# Patient Record
Sex: MALE | Race: WHITE | NOT HISPANIC OR LATINO | Employment: UNEMPLOYED | URBAN - NONMETROPOLITAN AREA
[De-identification: names, ages, dates, MRNs, and addresses within clinical notes are randomized per-mention and may not be internally consistent; named-entity substitution may affect disease eponyms.]

---

## 2019-09-20 ENCOUNTER — HOSPITAL ENCOUNTER (INPATIENT)
Facility: HOSPITAL | Age: 44
LOS: 5 days | Discharge: NURSING, REHAB OR LONG-TERM CARE | End: 2019-09-25
Attending: PSYCHIATRY & NEUROLOGY | Admitting: PSYCHIATRY & NEUROLOGY

## 2019-09-20 ENCOUNTER — HOSPITAL ENCOUNTER (EMERGENCY)
Facility: HOSPITAL | Age: 44
Discharge: STILL ADMITTED OR ADMINISTRATIVE | End: 2019-09-20
Attending: FAMILY MEDICINE

## 2019-09-20 VITALS
SYSTOLIC BLOOD PRESSURE: 124 MMHG | HEIGHT: 73 IN | RESPIRATION RATE: 19 BRPM | TEMPERATURE: 99.5 F | WEIGHT: 205 LBS | DIASTOLIC BLOOD PRESSURE: 76 MMHG | OXYGEN SATURATION: 96 % | BODY MASS INDEX: 27.17 KG/M2 | HEART RATE: 82 BPM

## 2019-09-20 DIAGNOSIS — F29 PSYCHOSIS, UNSPECIFIED PSYCHOSIS TYPE (HCC): Primary | ICD-10-CM

## 2019-09-20 LAB
6-ACETYL MORPHINE: NEGATIVE
ALBUMIN SERPL-MCNC: 4.22 G/DL (ref 3.5–5.2)
ALBUMIN/GLOB SERPL: 1.2 G/DL
ALP SERPL-CCNC: 81 U/L (ref 39–117)
ALT SERPL W P-5'-P-CCNC: 32 U/L (ref 1–41)
AMPHET+METHAMPHET UR QL: NEGATIVE
ANION GAP SERPL CALCULATED.3IONS-SCNC: 7.9 MMOL/L (ref 5–15)
AST SERPL-CCNC: 21 U/L (ref 1–40)
BARBITURATES UR QL SCN: NEGATIVE
BASOPHILS # BLD AUTO: 0.06 10*3/MM3 (ref 0–0.2)
BASOPHILS NFR BLD AUTO: 0.8 % (ref 0–1.5)
BENZODIAZ UR QL SCN: NEGATIVE
BILIRUB SERPL-MCNC: 0.2 MG/DL (ref 0.2–1.2)
BILIRUB UR QL STRIP: NEGATIVE
BUN BLD-MCNC: 14 MG/DL (ref 6–20)
BUN/CREAT SERPL: 17.5 (ref 7–25)
BUPRENORPHINE SERPL-MCNC: NEGATIVE NG/ML
CALCIUM SPEC-SCNC: 9.4 MG/DL (ref 8.6–10.5)
CANNABINOIDS SERPL QL: NEGATIVE
CHLORIDE SERPL-SCNC: 99 MMOL/L (ref 98–107)
CLARITY UR: CLEAR
CO2 SERPL-SCNC: 28.1 MMOL/L (ref 22–29)
COCAINE UR QL: NEGATIVE
COLOR UR: YELLOW
CREAT BLD-MCNC: 0.8 MG/DL (ref 0.76–1.27)
DEPRECATED RDW RBC AUTO: 44.4 FL (ref 37–54)
EOSINOPHIL # BLD AUTO: 0.24 10*3/MM3 (ref 0–0.4)
EOSINOPHIL NFR BLD AUTO: 3.2 % (ref 0.3–6.2)
ERYTHROCYTE [DISTWIDTH] IN BLOOD BY AUTOMATED COUNT: 13.6 % (ref 12.3–15.4)
ETHANOL BLD-MCNC: <10 MG/DL (ref 0–10)
ETHANOL UR QL: <0.01 %
GFR SERPL CREATININE-BSD FRML MDRD: 106 ML/MIN/1.73
GLOBULIN UR ELPH-MCNC: 3.4 GM/DL
GLUCOSE BLD-MCNC: 98 MG/DL (ref 65–99)
GLUCOSE UR STRIP-MCNC: NEGATIVE MG/DL
HCT VFR BLD AUTO: 44.5 % (ref 37.5–51)
HGB BLD-MCNC: 14.1 G/DL (ref 13–17.7)
HGB UR QL STRIP.AUTO: NEGATIVE
IMM GRANULOCYTES # BLD AUTO: 0.05 10*3/MM3 (ref 0–0.05)
IMM GRANULOCYTES NFR BLD AUTO: 0.7 % (ref 0–0.5)
KETONES UR QL STRIP: NEGATIVE
LEUKOCYTE ESTERASE UR QL STRIP.AUTO: NEGATIVE
LYMPHOCYTES # BLD AUTO: 2.4 10*3/MM3 (ref 0.7–3.1)
LYMPHOCYTES NFR BLD AUTO: 31.9 % (ref 19.6–45.3)
MAGNESIUM SERPL-MCNC: 2 MG/DL (ref 1.6–2.6)
MCH RBC QN AUTO: 28.8 PG (ref 26.6–33)
MCHC RBC AUTO-ENTMCNC: 31.7 G/DL (ref 31.5–35.7)
MCV RBC AUTO: 90.8 FL (ref 79–97)
METHADONE UR QL SCN: NEGATIVE
MONOCYTES # BLD AUTO: 0.97 10*3/MM3 (ref 0.1–0.9)
MONOCYTES NFR BLD AUTO: 12.9 % (ref 5–12)
NEUTROPHILS # BLD AUTO: 3.8 10*3/MM3 (ref 1.7–7)
NEUTROPHILS NFR BLD AUTO: 50.5 % (ref 42.7–76)
NITRITE UR QL STRIP: NEGATIVE
OPIATES UR QL: NEGATIVE
OXYCODONE UR QL SCN: NEGATIVE
PCP UR QL SCN: NEGATIVE
PH UR STRIP.AUTO: <=5 [PH] (ref 5–8)
PLATELET # BLD AUTO: 291 10*3/MM3 (ref 140–450)
PMV BLD AUTO: 10 FL (ref 6–12)
POTASSIUM BLD-SCNC: 4.7 MMOL/L (ref 3.5–5.2)
PROT SERPL-MCNC: 7.6 G/DL (ref 6–8.5)
PROT UR QL STRIP: NEGATIVE
RBC # BLD AUTO: 4.9 10*6/MM3 (ref 4.14–5.8)
SODIUM BLD-SCNC: 135 MMOL/L (ref 136–145)
SP GR UR STRIP: 1.02 (ref 1–1.03)
UROBILINOGEN UR QL STRIP: NORMAL
WBC NRBC COR # BLD: 7.52 10*3/MM3 (ref 3.4–10.8)

## 2019-09-20 PROCEDURE — 80307 DRUG TEST PRSMV CHEM ANLYZR: CPT | Performed by: FAMILY MEDICINE

## 2019-09-20 PROCEDURE — 81003 URINALYSIS AUTO W/O SCOPE: CPT | Performed by: FAMILY MEDICINE

## 2019-09-20 PROCEDURE — 99285 EMERGENCY DEPT VISIT HI MDM: CPT

## 2019-09-20 PROCEDURE — 85025 COMPLETE CBC W/AUTO DIFF WBC: CPT | Performed by: FAMILY MEDICINE

## 2019-09-20 PROCEDURE — 80053 COMPREHEN METABOLIC PANEL: CPT | Performed by: FAMILY MEDICINE

## 2019-09-20 PROCEDURE — 93010 ELECTROCARDIOGRAM REPORT: CPT | Performed by: INTERNAL MEDICINE

## 2019-09-20 PROCEDURE — 83735 ASSAY OF MAGNESIUM: CPT | Performed by: FAMILY MEDICINE

## 2019-09-20 PROCEDURE — 93005 ELECTROCARDIOGRAM TRACING: CPT | Performed by: PSYCHIATRY & NEUROLOGY

## 2019-09-20 RX ORDER — ACETAMINOPHEN 325 MG/1
650 TABLET ORAL EVERY 6 HOURS PRN
Status: DISCONTINUED | OUTPATIENT
Start: 2019-09-20 | End: 2019-09-25 | Stop reason: HOSPADM

## 2019-09-20 RX ORDER — ALUMINA, MAGNESIA, AND SIMETHICONE 2400; 2400; 240 MG/30ML; MG/30ML; MG/30ML
15 SUSPENSION ORAL EVERY 6 HOURS PRN
Status: DISCONTINUED | OUTPATIENT
Start: 2019-09-20 | End: 2019-09-25 | Stop reason: HOSPADM

## 2019-09-20 RX ORDER — HYDROXYZINE 50 MG/1
50 TABLET, FILM COATED ORAL EVERY 6 HOURS PRN
Status: DISCONTINUED | OUTPATIENT
Start: 2019-09-20 | End: 2019-09-25 | Stop reason: HOSPADM

## 2019-09-20 RX ORDER — IBUPROFEN 400 MG/1
400 TABLET ORAL EVERY 6 HOURS PRN
Status: DISCONTINUED | OUTPATIENT
Start: 2019-09-20 | End: 2019-09-25 | Stop reason: HOSPADM

## 2019-09-20 RX ORDER — LOPERAMIDE HYDROCHLORIDE 2 MG/1
2 CAPSULE ORAL
Status: DISCONTINUED | OUTPATIENT
Start: 2019-09-20 | End: 2019-09-25 | Stop reason: HOSPADM

## 2019-09-20 RX ORDER — ONDANSETRON 4 MG/1
4 TABLET, FILM COATED ORAL EVERY 6 HOURS PRN
Status: DISCONTINUED | OUTPATIENT
Start: 2019-09-20 | End: 2019-09-25 | Stop reason: HOSPADM

## 2019-09-20 RX ORDER — BUSPIRONE HYDROCHLORIDE 10 MG/1
10 TABLET ORAL 2 TIMES DAILY
Status: ON HOLD | COMMUNITY
End: 2019-09-25 | Stop reason: SDUPTHER

## 2019-09-20 RX ORDER — CITALOPRAM 20 MG/1
20 TABLET ORAL DAILY
Status: ON HOLD | COMMUNITY
End: 2019-09-25 | Stop reason: SDUPTHER

## 2019-09-20 RX ORDER — NICOTINE 21 MG/24HR
1 PATCH, TRANSDERMAL 24 HOURS TRANSDERMAL
Status: DISCONTINUED | OUTPATIENT
Start: 2019-09-20 | End: 2019-09-25 | Stop reason: HOSPADM

## 2019-09-20 RX ORDER — BUSPIRONE HYDROCHLORIDE 10 MG/1
10 TABLET ORAL 2 TIMES DAILY
Status: DISCONTINUED | OUTPATIENT
Start: 2019-09-20 | End: 2019-09-25 | Stop reason: HOSPADM

## 2019-09-20 RX ORDER — TRAZODONE HYDROCHLORIDE 50 MG/1
50 TABLET ORAL NIGHTLY PRN
Status: DISCONTINUED | OUTPATIENT
Start: 2019-09-20 | End: 2019-09-25 | Stop reason: HOSPADM

## 2019-09-20 RX ORDER — FAMOTIDINE 20 MG/1
20 TABLET, FILM COATED ORAL 2 TIMES DAILY PRN
Status: DISCONTINUED | OUTPATIENT
Start: 2019-09-20 | End: 2019-09-25 | Stop reason: HOSPADM

## 2019-09-20 RX ORDER — BENZTROPINE MESYLATE 1 MG/ML
1 INJECTION INTRAMUSCULAR; INTRAVENOUS ONCE AS NEEDED
Status: DISCONTINUED | OUTPATIENT
Start: 2019-09-20 | End: 2019-09-25 | Stop reason: HOSPADM

## 2019-09-20 RX ORDER — MULTIVITAMIN
1 TABLET ORAL DAILY
Status: DISCONTINUED | OUTPATIENT
Start: 2019-09-21 | End: 2019-09-25 | Stop reason: HOSPADM

## 2019-09-20 RX ORDER — BENZTROPINE MESYLATE 1 MG/1
2 TABLET ORAL ONCE AS NEEDED
Status: DISCONTINUED | OUTPATIENT
Start: 2019-09-20 | End: 2019-09-25 | Stop reason: HOSPADM

## 2019-09-20 RX ORDER — MULTIVITAMIN
1 TABLET ORAL DAILY
COMMUNITY
End: 2019-09-25 | Stop reason: HOSPADM

## 2019-09-20 RX ORDER — OLANZAPINE 10 MG/1
20 TABLET ORAL NIGHTLY
Status: CANCELLED | OUTPATIENT
Start: 2019-09-20

## 2019-09-20 RX ORDER — OLANZAPINE 5 MG/1
10 TABLET ORAL 2 TIMES DAILY PRN
Status: DISCONTINUED | OUTPATIENT
Start: 2019-09-20 | End: 2019-09-20

## 2019-09-20 RX ORDER — CITALOPRAM 20 MG/1
20 TABLET ORAL DAILY
Status: DISCONTINUED | OUTPATIENT
Start: 2019-09-21 | End: 2019-09-25 | Stop reason: HOSPADM

## 2019-09-20 RX ORDER — BENZONATATE 100 MG/1
100 CAPSULE ORAL 3 TIMES DAILY PRN
Status: DISCONTINUED | OUTPATIENT
Start: 2019-09-20 | End: 2019-09-25 | Stop reason: HOSPADM

## 2019-09-20 RX ORDER — OLANZAPINE 5 MG/1
20 TABLET ORAL NIGHTLY
Status: DISCONTINUED | OUTPATIENT
Start: 2019-09-20 | End: 2019-09-25 | Stop reason: HOSPADM

## 2019-09-20 RX ORDER — OLANZAPINE 20 MG/1
20 TABLET ORAL NIGHTLY
Status: ON HOLD | COMMUNITY
End: 2019-09-25 | Stop reason: SDUPTHER

## 2019-09-20 RX ORDER — ECHINACEA PURPUREA EXTRACT 125 MG
2 TABLET ORAL AS NEEDED
Status: DISCONTINUED | OUTPATIENT
Start: 2019-09-20 | End: 2019-09-25 | Stop reason: HOSPADM

## 2019-09-20 RX ADMIN — BUSPIRONE HYDROCHLORIDE 10 MG: 10 TABLET ORAL at 21:10

## 2019-09-20 RX ADMIN — OLANZAPINE 20 MG: 5 TABLET, FILM COATED ORAL at 21:10

## 2019-09-20 RX ADMIN — IBUPROFEN 400 MG: 400 TABLET, FILM COATED ORAL at 21:10

## 2019-09-21 PROCEDURE — 99223 1ST HOSP IP/OBS HIGH 75: CPT | Performed by: PSYCHIATRY & NEUROLOGY

## 2019-09-21 RX ORDER — ARIPIPRAZOLE 10 MG/1
5 TABLET ORAL DAILY
Status: DISCONTINUED | OUTPATIENT
Start: 2019-09-21 | End: 2019-09-25 | Stop reason: HOSPADM

## 2019-09-21 RX ORDER — LIDOCAINE HYDROCHLORIDE 20 MG/ML
5 SOLUTION OROPHARYNGEAL
Status: DISCONTINUED | OUTPATIENT
Start: 2019-09-21 | End: 2019-09-25 | Stop reason: HOSPADM

## 2019-09-21 RX ADMIN — BUSPIRONE HYDROCHLORIDE 10 MG: 10 TABLET ORAL at 08:45

## 2019-09-21 RX ADMIN — IBUPROFEN 400 MG: 400 TABLET, FILM COATED ORAL at 18:03

## 2019-09-21 RX ADMIN — Medication 1 TABLET: at 08:45

## 2019-09-21 RX ADMIN — OLANZAPINE 20 MG: 5 TABLET, FILM COATED ORAL at 20:15

## 2019-09-21 RX ADMIN — CITALOPRAM HYDROBROMIDE 20 MG: 20 TABLET ORAL at 08:45

## 2019-09-21 RX ADMIN — NICOTINE 1 PATCH: 21 PATCH TRANSDERMAL at 09:51

## 2019-09-21 RX ADMIN — BUSPIRONE HYDROCHLORIDE 10 MG: 10 TABLET ORAL at 20:15

## 2019-09-21 RX ADMIN — IBUPROFEN 400 MG: 400 TABLET, FILM COATED ORAL at 08:45

## 2019-09-21 RX ADMIN — ARIPIPRAZOLE 5 MG: 10 TABLET ORAL at 15:04

## 2019-09-22 PROCEDURE — 99232 SBSQ HOSP IP/OBS MODERATE 35: CPT | Performed by: PSYCHIATRY & NEUROLOGY

## 2019-09-22 RX ADMIN — OLANZAPINE 20 MG: 5 TABLET, FILM COATED ORAL at 20:30

## 2019-09-22 RX ADMIN — ACETAMINOPHEN 650 MG: 325 TABLET ORAL at 20:31

## 2019-09-22 RX ADMIN — NICOTINE 1 PATCH: 21 PATCH TRANSDERMAL at 09:42

## 2019-09-22 RX ADMIN — Medication 1 TABLET: at 09:37

## 2019-09-22 RX ADMIN — CITALOPRAM HYDROBROMIDE 20 MG: 20 TABLET ORAL at 09:37

## 2019-09-22 RX ADMIN — BUSPIRONE HYDROCHLORIDE 10 MG: 10 TABLET ORAL at 20:30

## 2019-09-22 RX ADMIN — ARIPIPRAZOLE 5 MG: 10 TABLET ORAL at 09:37

## 2019-09-22 RX ADMIN — BUSPIRONE HYDROCHLORIDE 10 MG: 10 TABLET ORAL at 09:38

## 2019-09-22 RX ADMIN — IBUPROFEN 400 MG: 400 TABLET, FILM COATED ORAL at 15:45

## 2019-09-23 PROBLEM — F22 PARANOID DELUSION: Status: ACTIVE | Noted: 2019-09-23

## 2019-09-23 PROBLEM — F60.2 ANTISOCIAL PERSONALITY DISORDER: Status: ACTIVE | Noted: 2019-09-23

## 2019-09-23 PROBLEM — F19.10 POLYSUBSTANCE ABUSE (HCC): Status: ACTIVE | Noted: 2019-09-23

## 2019-09-23 PROBLEM — F15.150: Status: ACTIVE | Noted: 2019-09-23

## 2019-09-23 PROBLEM — F15.10 METHAMPHETAMINE ABUSE: Status: ACTIVE | Noted: 2019-09-23

## 2019-09-23 PROBLEM — F15.94: Status: ACTIVE | Noted: 2019-09-23

## 2019-09-23 PROCEDURE — 99233 SBSQ HOSP IP/OBS HIGH 50: CPT | Performed by: PSYCHIATRY & NEUROLOGY

## 2019-09-23 RX ADMIN — BUSPIRONE HYDROCHLORIDE 10 MG: 10 TABLET ORAL at 20:17

## 2019-09-23 RX ADMIN — NICOTINE 1 PATCH: 21 PATCH TRANSDERMAL at 09:46

## 2019-09-23 RX ADMIN — ARIPIPRAZOLE 5 MG: 10 TABLET ORAL at 09:45

## 2019-09-23 RX ADMIN — CITALOPRAM HYDROBROMIDE 20 MG: 20 TABLET ORAL at 09:45

## 2019-09-23 RX ADMIN — IBUPROFEN 400 MG: 400 TABLET, FILM COATED ORAL at 20:16

## 2019-09-23 RX ADMIN — IBUPROFEN 400 MG: 400 TABLET, FILM COATED ORAL at 09:56

## 2019-09-23 RX ADMIN — OLANZAPINE 20 MG: 5 TABLET, FILM COATED ORAL at 20:17

## 2019-09-23 RX ADMIN — BUSPIRONE HYDROCHLORIDE 10 MG: 10 TABLET ORAL at 09:45

## 2019-09-23 RX ADMIN — Medication 1 TABLET: at 09:45

## 2019-09-24 PROCEDURE — 99232 SBSQ HOSP IP/OBS MODERATE 35: CPT | Performed by: PSYCHIATRY & NEUROLOGY

## 2019-09-24 RX ADMIN — BUSPIRONE HYDROCHLORIDE 10 MG: 10 TABLET ORAL at 20:14

## 2019-09-24 RX ADMIN — Medication 1 TABLET: at 09:16

## 2019-09-24 RX ADMIN — IBUPROFEN 400 MG: 400 TABLET, FILM COATED ORAL at 15:38

## 2019-09-24 RX ADMIN — NICOTINE 1 PATCH: 21 PATCH TRANSDERMAL at 09:15

## 2019-09-24 RX ADMIN — OLANZAPINE 20 MG: 5 TABLET, FILM COATED ORAL at 20:14

## 2019-09-24 RX ADMIN — BUSPIRONE HYDROCHLORIDE 10 MG: 10 TABLET ORAL at 09:17

## 2019-09-24 RX ADMIN — CITALOPRAM HYDROBROMIDE 20 MG: 20 TABLET ORAL at 09:16

## 2019-09-24 RX ADMIN — ARIPIPRAZOLE 5 MG: 10 TABLET ORAL at 09:17

## 2019-09-25 VITALS
TEMPERATURE: 97.9 F | BODY MASS INDEX: 27.36 KG/M2 | OXYGEN SATURATION: 97 % | HEIGHT: 72 IN | RESPIRATION RATE: 18 BRPM | WEIGHT: 202 LBS | SYSTOLIC BLOOD PRESSURE: 142 MMHG | DIASTOLIC BLOOD PRESSURE: 83 MMHG | HEART RATE: 98 BPM

## 2019-09-25 PROCEDURE — 99239 HOSP IP/OBS DSCHRG MGMT >30: CPT | Performed by: PSYCHIATRY & NEUROLOGY

## 2019-09-25 RX ORDER — CITALOPRAM 20 MG/1
20 TABLET ORAL DAILY
Qty: 30 TABLET | Refills: 0 | Status: SHIPPED | OUTPATIENT
Start: 2019-09-25 | End: 2022-08-16

## 2019-09-25 RX ORDER — BUSPIRONE HYDROCHLORIDE 10 MG/1
10 TABLET ORAL 2 TIMES DAILY
Qty: 60 TABLET | Refills: 0 | Status: SHIPPED | OUTPATIENT
Start: 2019-09-25 | End: 2022-08-16

## 2019-09-25 RX ORDER — OLANZAPINE 20 MG/1
20 TABLET ORAL NIGHTLY
Qty: 30 TABLET | Refills: 0 | Status: ON HOLD | OUTPATIENT
Start: 2019-09-25 | End: 2023-03-16

## 2019-09-25 RX ADMIN — CITALOPRAM HYDROBROMIDE 20 MG: 20 TABLET ORAL at 08:48

## 2019-09-25 RX ADMIN — BUSPIRONE HYDROCHLORIDE 10 MG: 10 TABLET ORAL at 08:51

## 2019-09-25 RX ADMIN — Medication 1 TABLET: at 08:48

## 2019-09-25 RX ADMIN — ARIPIPRAZOLE 5 MG: 10 TABLET ORAL at 08:51

## 2019-09-25 RX ADMIN — NICOTINE 1 PATCH: 21 PATCH TRANSDERMAL at 08:51

## 2020-10-19 ENCOUNTER — HOSPITAL ENCOUNTER (EMERGENCY)
Facility: HOSPITAL | Age: 45
Discharge: HOME | End: 2020-10-19
Attending: EMERGENCY MEDICINE | Admitting: EMERGENCY MEDICINE

## 2020-10-19 ENCOUNTER — APPOINTMENT (OUTPATIENT)
Dept: GENERAL RADIOLOGY | Facility: HOSPITAL | Age: 45
End: 2020-10-19

## 2020-10-19 VITALS
DIASTOLIC BLOOD PRESSURE: 97 MMHG | HEART RATE: 86 BPM | OXYGEN SATURATION: 99 % | SYSTOLIC BLOOD PRESSURE: 137 MMHG | RESPIRATION RATE: 18 BRPM | TEMPERATURE: 97.4 F

## 2020-10-19 DIAGNOSIS — L03.114 CELLULITIS OF HAND, LEFT: ICD-10-CM

## 2020-10-19 DIAGNOSIS — M54.41 ACUTE MIDLINE LOW BACK PAIN WITH BILATERAL SCIATICA: Primary | ICD-10-CM

## 2020-10-19 DIAGNOSIS — F15.10 METHAMPHETAMINE ABUSE, EPISODIC (HCC): ICD-10-CM

## 2020-10-19 DIAGNOSIS — M54.42 ACUTE MIDLINE LOW BACK PAIN WITH BILATERAL SCIATICA: Primary | ICD-10-CM

## 2020-10-19 PROCEDURE — 25010000002 DEXAMETHASONE SODIUM PHOSPHATE 20 MG/5ML SOLUTION: Performed by: PHYSICIAN ASSISTANT

## 2020-10-19 PROCEDURE — 72110 X-RAY EXAM L-2 SPINE 4/>VWS: CPT

## 2020-10-19 PROCEDURE — 99283 EMERGENCY DEPT VISIT LOW MDM: CPT

## 2020-10-19 PROCEDURE — 96372 THER/PROPH/DIAG INJ SC/IM: CPT

## 2020-10-19 RX ORDER — BACLOFEN 10 MG/1
10 TABLET ORAL ONCE
Status: COMPLETED | OUTPATIENT
Start: 2020-10-19 | End: 2020-10-19

## 2020-10-19 RX ORDER — IBUPROFEN 600 MG/1
600 TABLET ORAL EVERY 8 HOURS PRN
Qty: 21 TABLET | Refills: 0 | Status: SHIPPED | OUTPATIENT
Start: 2020-10-19 | End: 2022-08-16

## 2020-10-19 RX ORDER — BACLOFEN 10 MG/1
10 TABLET ORAL 2 TIMES DAILY
Qty: 14 TABLET | Refills: 0 | Status: SHIPPED | OUTPATIENT
Start: 2020-10-19 | End: 2022-08-16

## 2020-10-19 RX ORDER — DOXYCYCLINE 100 MG/1
100 CAPSULE ORAL ONCE
Status: COMPLETED | OUTPATIENT
Start: 2020-10-19 | End: 2020-10-19

## 2020-10-19 RX ORDER — DEXAMETHASONE SODIUM PHOSPHATE 4 MG/ML
10 INJECTION, SOLUTION INTRA-ARTICULAR; INTRALESIONAL; INTRAMUSCULAR; INTRAVENOUS; SOFT TISSUE ONCE
Status: COMPLETED | OUTPATIENT
Start: 2020-10-19 | End: 2020-10-19

## 2020-10-19 RX ORDER — DOXYCYCLINE 100 MG/1
100 CAPSULE ORAL 2 TIMES DAILY
Qty: 20 CAPSULE | Refills: 0 | Status: SHIPPED | OUTPATIENT
Start: 2020-10-19 | End: 2022-08-16

## 2020-10-19 RX ORDER — GABAPENTIN 100 MG/1
100 CAPSULE ORAL 2 TIMES DAILY
COMMUNITY
End: 2022-08-16

## 2020-10-19 RX ADMIN — DOXYCYCLINE 100 MG: 100 CAPSULE ORAL at 23:07

## 2020-10-19 RX ADMIN — DEXAMETHASONE SODIUM PHOSPHATE 10 MG: 4 INJECTION, SOLUTION INTRA-ARTICULAR; INTRALESIONAL; INTRAMUSCULAR; INTRAVENOUS; SOFT TISSUE at 21:43

## 2020-10-19 RX ADMIN — BACLOFEN 10 MG: 10 TABLET ORAL at 21:42

## 2022-08-16 ENCOUNTER — HOSPITAL ENCOUNTER (EMERGENCY)
Facility: HOSPITAL | Age: 47
Discharge: HOME | End: 2022-08-16
Attending: EMERGENCY MEDICINE | Admitting: EMERGENCY MEDICINE

## 2022-08-16 VITALS
RESPIRATION RATE: 16 BRPM | HEIGHT: 73 IN | OXYGEN SATURATION: 95 % | DIASTOLIC BLOOD PRESSURE: 77 MMHG | TEMPERATURE: 97.5 F | HEART RATE: 88 BPM | BODY MASS INDEX: 28.14 KG/M2 | WEIGHT: 212.3 LBS | SYSTOLIC BLOOD PRESSURE: 140 MMHG

## 2022-08-16 DIAGNOSIS — R10.9 FLANK PAIN: ICD-10-CM

## 2022-08-16 DIAGNOSIS — S61.216A LACERATION OF RIGHT LITTLE FINGER WITHOUT FOREIGN BODY WITHOUT DAMAGE TO NAIL, INITIAL ENCOUNTER: Primary | ICD-10-CM

## 2022-08-16 DIAGNOSIS — R31.9 HEMATURIA, UNSPECIFIED TYPE: ICD-10-CM

## 2022-08-16 LAB
ALBUMIN SERPL-MCNC: 4.4 G/DL (ref 3.5–5.2)
ALBUMIN/GLOB SERPL: 1.4 G/DL
ALP SERPL-CCNC: 85 U/L (ref 39–117)
ALT SERPL W P-5'-P-CCNC: 26 U/L (ref 1–41)
ANION GAP SERPL CALCULATED.3IONS-SCNC: 10.4 MMOL/L (ref 5–15)
AST SERPL-CCNC: 16 U/L (ref 1–40)
BACTERIA UR QL AUTO: ABNORMAL /HPF
BASOPHILS # BLD AUTO: 0.05 10*3/MM3 (ref 0–0.2)
BASOPHILS NFR BLD AUTO: 1.1 % (ref 0–1.5)
BILIRUB SERPL-MCNC: 0.2 MG/DL (ref 0–1.2)
BILIRUB UR QL STRIP: NEGATIVE
BUN SERPL-MCNC: 7 MG/DL (ref 6–20)
BUN/CREAT SERPL: 7.9 (ref 7–25)
CALCIUM SPEC-SCNC: 9.9 MG/DL (ref 8.6–10.5)
CHLORIDE SERPL-SCNC: 106 MMOL/L (ref 98–107)
CK MB SERPL-CCNC: 3.39 NG/ML
CLARITY UR: CLEAR
CO2 SERPL-SCNC: 26.6 MMOL/L (ref 22–29)
COLOR UR: YELLOW
CREAT SERPL-MCNC: 0.89 MG/DL (ref 0.76–1.27)
DEPRECATED RDW RBC AUTO: 42.5 FL (ref 37–54)
EGFRCR SERPLBLD CKD-EPI 2021: 107.7 ML/MIN/1.73
EOSINOPHIL # BLD AUTO: 0.08 10*3/MM3 (ref 0–0.4)
EOSINOPHIL NFR BLD AUTO: 1.7 % (ref 0.3–6.2)
ERYTHROCYTE [DISTWIDTH] IN BLOOD BY AUTOMATED COUNT: 13.2 % (ref 12.3–15.4)
GLOBULIN UR ELPH-MCNC: 3.2 GM/DL
GLUCOSE SERPL-MCNC: 94 MG/DL (ref 65–99)
GLUCOSE UR STRIP-MCNC: NEGATIVE MG/DL
HCT VFR BLD AUTO: 43.9 % (ref 37.5–51)
HGB BLD-MCNC: 14.4 G/DL (ref 13–17.7)
HGB UR QL STRIP.AUTO: ABNORMAL
HYALINE CASTS UR QL AUTO: ABNORMAL /LPF
IMM GRANULOCYTES # BLD AUTO: 0 10*3/MM3 (ref 0–0.05)
IMM GRANULOCYTES NFR BLD AUTO: 0 % (ref 0–0.5)
KETONES UR QL STRIP: NEGATIVE
LEUKOCYTE ESTERASE UR QL STRIP.AUTO: NEGATIVE
LYMPHOCYTES # BLD AUTO: 1.76 10*3/MM3 (ref 0.7–3.1)
LYMPHOCYTES NFR BLD AUTO: 37.1 % (ref 19.6–45.3)
MCH RBC QN AUTO: 28.8 PG (ref 26.6–33)
MCHC RBC AUTO-ENTMCNC: 32.8 G/DL (ref 31.5–35.7)
MCV RBC AUTO: 87.8 FL (ref 79–97)
MONOCYTES # BLD AUTO: 0.91 10*3/MM3 (ref 0.1–0.9)
MONOCYTES NFR BLD AUTO: 19.2 % (ref 5–12)
NEUTROPHILS NFR BLD AUTO: 1.94 10*3/MM3 (ref 1.7–7)
NEUTROPHILS NFR BLD AUTO: 40.9 % (ref 42.7–76)
NITRITE UR QL STRIP: NEGATIVE
NRBC BLD AUTO-RTO: 0 /100 WBC (ref 0–0.2)
PH UR STRIP.AUTO: 6.5 [PH] (ref 5–8)
PLATELET # BLD AUTO: 353 10*3/MM3 (ref 140–450)
PMV BLD AUTO: 10.2 FL (ref 6–12)
POTASSIUM SERPL-SCNC: 3.9 MMOL/L (ref 3.5–5.2)
PROT SERPL-MCNC: 7.6 G/DL (ref 6–8.5)
PROT UR QL STRIP: NEGATIVE
RBC # BLD AUTO: 5 10*6/MM3 (ref 4.14–5.8)
RBC # UR STRIP: ABNORMAL /HPF
REF LAB TEST METHOD: ABNORMAL
SODIUM SERPL-SCNC: 143 MMOL/L (ref 136–145)
SP GR UR STRIP: <=1.005 (ref 1–1.03)
SQUAMOUS #/AREA URNS HPF: ABNORMAL /HPF
UROBILINOGEN UR QL STRIP: ABNORMAL
WBC # UR STRIP: ABNORMAL /HPF
WBC NRBC COR # BLD: 4.74 10*3/MM3 (ref 3.4–10.8)

## 2022-08-16 PROCEDURE — 90471 IMMUNIZATION ADMIN: CPT | Performed by: NURSE PRACTITIONER

## 2022-08-16 PROCEDURE — 80053 COMPREHEN METABOLIC PANEL: CPT | Performed by: NURSE PRACTITIONER

## 2022-08-16 PROCEDURE — 87086 URINE CULTURE/COLONY COUNT: CPT | Performed by: NURSE PRACTITIONER

## 2022-08-16 PROCEDURE — 36415 COLL VENOUS BLD VENIPUNCTURE: CPT

## 2022-08-16 PROCEDURE — 85025 COMPLETE CBC W/AUTO DIFF WBC: CPT | Performed by: NURSE PRACTITIONER

## 2022-08-16 PROCEDURE — 25010000002 TETANUS-DIPHTH-ACELL PERTUSSIS 5-2.5-18.5 LF-MCG/0.5 SUSPENSION PREFILLED SYRINGE: Performed by: NURSE PRACTITIONER

## 2022-08-16 PROCEDURE — 82553 CREATINE MB FRACTION: CPT | Performed by: NURSE PRACTITIONER

## 2022-08-16 PROCEDURE — 90715 TDAP VACCINE 7 YRS/> IM: CPT | Performed by: NURSE PRACTITIONER

## 2022-08-16 PROCEDURE — 99283 EMERGENCY DEPT VISIT LOW MDM: CPT

## 2022-08-16 PROCEDURE — 81001 URINALYSIS AUTO W/SCOPE: CPT | Performed by: NURSE PRACTITIONER

## 2022-08-16 RX ORDER — CEPHALEXIN 250 MG/1
500 CAPSULE ORAL ONCE
Status: COMPLETED | OUTPATIENT
Start: 2022-08-16 | End: 2022-08-16

## 2022-08-16 RX ORDER — GINSENG 100 MG
1 CAPSULE ORAL 2 TIMES DAILY
Qty: 15 G | Refills: 0 | OUTPATIENT
Start: 2022-08-16 | End: 2023-01-23

## 2022-08-16 RX ORDER — DIAPER,BRIEF,INFANT-TODD,DISP
1 EACH MISCELLANEOUS ONCE
Status: COMPLETED | OUTPATIENT
Start: 2022-08-16 | End: 2022-08-16

## 2022-08-16 RX ORDER — CEPHALEXIN 500 MG/1
500 CAPSULE ORAL 2 TIMES DAILY
Qty: 14 CAPSULE | Refills: 0 | OUTPATIENT
Start: 2022-08-16 | End: 2023-01-23

## 2022-08-16 RX ADMIN — Medication 1 APPLICATION: at 13:35

## 2022-08-16 RX ADMIN — CEPHALEXIN 500 MG: 250 CAPSULE ORAL at 13:34

## 2022-08-16 RX ADMIN — TETANUS TOXOID, REDUCED DIPHTHERIA TOXOID AND ACELLULAR PERTUSSIS VACCINE, ADSORBED 0.5 ML: 5; 2.5; 8; 8; 2.5 SUSPENSION INTRAMUSCULAR at 13:34

## 2022-08-16 NOTE — ED PROVIDER NOTES
Time: 15:12 EDT  Arrived by: Private vehicle  Chief Complaint: Right finger laceration, flank pain  History provided by: Patient  History is limited by: N/A    History of Present Illness:  Patient is a 47 y.o. year old male that presents to the emergency department with complaints of a laceration to the right hand, fifth finger.  Patient reports that he had a knife in his hand and it slipped when he was trying to stab the wall and ended up cutting the ulnar side of his right fifth finger.  Patient reports this occurred 7 days ago.  Received no prior treatment for the injury.  The laceration is still open and healing by secondary intention.  Finger is somewhat red and swollen.  There is no discharge present.  Denies fever.  Complains of mild pain.  Unsure if his tetanus is up-to-date.    Patient additionally reports that at the beginning of this month in August he was admitted at the hospital for rhabdomyolysis after an episode of relapse and substance use.  Patient's drug of choice in our meth and heroin.  Last use was 3 days ago.  Today, he is additionally complaining of right flank pain as well as left flank pain.  Pain is nonradiating.  He states that he would like to have his kidney function checked to make sure that he is not having any further episodes of rhabdomyolysis.  He denies any difficulty with urination.  He reports that his urine is clear and yellow.  He reports that he is tolerating p.o. well.  He denies any nausea or vomiting.  Denies any abdominal pain.  He reports that he is supposed to go into substance treatment for rehab tomorrow.    HPI        Similar Symptoms Previously: Not previously seen for his laceration however he was admitted to the hospital for rhabdo in the beginning of this month.  Recently seen: No      Patient Care Team  Primary Care Provider: Unknown    Past Medical History:     No Known Allergies  Past Medical History:   Diagnosis Date   • Alcohol abuse    • Anxiety    • Bipolar  disorder (HCC)    • Depression    • Hepatitis C infection    • Suicide attempt (HCC)      Past Surgical History:   Procedure Laterality Date   • HAND SURGERY      right hand      History reviewed. No pertinent family history.    Home Medications:  Prior to Admission medications    Medication Sig Start Date End Date Taking? Authorizing Provider   OLANZapine (zyPREXA) 20 MG tablet Take 1 tablet by mouth Every Night. 9/25/19   Mynor Michelle MD   baclofen (LIORESAL) 10 MG tablet Take 1 tablet by mouth 2 (Two) Times a Day. 10/19/20 8/16/22  Pablo Chapman MD   busPIRone (BUSPAR) 10 MG tablet Take 1 tablet by mouth 2 (Two) Times a Day. 9/25/19 8/16/22  Mynor Michelle MD   citalopram (CeleXA) 20 MG tablet Take 1 tablet by mouth Daily. 9/25/19 8/16/22  Mynor Michelle MD   doxycycline (MONODOX) 100 MG capsule Take 1 capsule by mouth 2 (Two) Times a Day. 10/19/20 8/16/22  Pablo Chapman MD   gabapentin (NEURONTIN) 100 MG capsule Take 100 mg by mouth 2 (two) times a day.  8/16/22  Filemon Harris MD   ibuprofen (ADVIL,MOTRIN) 600 MG tablet Take 1 tablet by mouth Every 8 (Eight) Hours As Needed for Moderate Pain . 10/19/20 8/16/22  Pablo Chapman MD   sertraline (ZOLOFT) 50 MG tablet Take 50 mg by mouth Daily.  8/16/22  ProviderFilemon MD        Social History:   PT  reports that he has been smoking cigarettes. He has been smoking about 0.50 packs per day. He has never used smokeless tobacco. He reports current alcohol use of about 2.0 standard drinks of alcohol per week. He reports current drug use. Drugs: Amphetamines, Heroin, Cocaine(coke), and Marijuana.    Record Review:  I have reviewed the patient's records in [facility].     Review of Systems  Review of Systems   Constitutional: Negative for chills, fatigue and fever.   HENT: Negative for rhinorrhea and sore throat.    Eyes: Negative.    Respiratory: Negative for cough, chest tightness and shortness of breath.   "  Cardiovascular: Negative for chest pain and palpitations.   Gastrointestinal: Negative for abdominal pain, diarrhea, nausea and vomiting.   Endocrine: Negative.    Genitourinary: Positive for flank pain (Left and right flank pain). Negative for decreased urine volume, difficulty urinating, dysuria, frequency, hematuria, penile pain, penile swelling, testicular pain and urgency.   Musculoskeletal: Negative for arthralgias and myalgias.   Skin: Positive for wound (Laceration to right fifth finger). Negative for color change and rash.   Allergic/Immunologic: Negative.    Neurological: Negative for dizziness, syncope, weakness, light-headedness and headaches.   Hematological: Negative.    Psychiatric/Behavioral: Negative for agitation and confusion. The patient is not nervous/anxious.         Physical Exam  /77   Pulse 88   Temp 97.5 °F (36.4 °C) (Oral)   Resp 16   Ht 185.4 cm (73\")   Wt 96.3 kg (212 lb 4.9 oz)   SpO2 95%   BMI 28.01 kg/m²     Physical Exam  Vitals and nursing note reviewed.   Constitutional:       General: He is not in acute distress.     Appearance: Normal appearance. He is not ill-appearing.   HENT:      Head: Normocephalic and atraumatic.      Nose: Nose normal.   Eyes:      Extraocular Movements: Extraocular movements intact.      Conjunctiva/sclera: Conjunctivae normal.      Pupils: Pupils are equal, round, and reactive to light.   Cardiovascular:      Rate and Rhythm: Normal rate and regular rhythm.      Pulses: Normal pulses.      Heart sounds: Normal heart sounds. No murmur heard.    No gallop.   Pulmonary:      Effort: Pulmonary effort is normal. No respiratory distress.      Breath sounds: Normal breath sounds. No wheezing, rhonchi or rales.   Chest:      Chest wall: No tenderness.   Abdominal:      General: Bowel sounds are normal. There is no distension.      Palpations: Abdomen is soft.      Tenderness: There is no abdominal tenderness. There is right CVA tenderness and left " "CVA tenderness. There is no guarding.   Musculoskeletal:         General: Swelling (Right fifth digit), tenderness (Right fifth digit) and signs of injury present. No deformity. Normal range of motion.      Cervical back: Normal range of motion and neck supple.   Skin:     General: Skin is warm and dry.      Capillary Refill: Capillary refill takes less than 2 seconds.      Findings: No erythema or rash.      Comments: Healing laceration noted to right fifth digit lateral side.  There is no active drainage.  Scabbing is noted.  There is no bleeding.  Is open to air upon arrival.  There is mild erythema to the surrounding tissue.  There is some mild tenderness and swelling to the fifth digit.  Redness does not extend past the finger.   Neurological:      Mental Status: He is alert and oriented to person, place, and time.      Motor: No weakness.   Psychiatric:         Mood and Affect: Mood normal.         Behavior: Behavior normal.                  ED Course  /77   Pulse 88   Temp 97.5 °F (36.4 °C) (Oral)   Resp 16   Ht 185.4 cm (73\")   Wt 96.3 kg (212 lb 4.9 oz)   SpO2 95%   BMI 28.01 kg/m²   Results for orders placed or performed during the hospital encounter of 08/16/22   Comprehensive Metabolic Panel    Specimen: Blood   Result Value Ref Range    Glucose 94 65 - 99 mg/dL    BUN 7 6 - 20 mg/dL    Creatinine 0.89 0.76 - 1.27 mg/dL    Sodium 143 136 - 145 mmol/L    Potassium 3.9 3.5 - 5.2 mmol/L    Chloride 106 98 - 107 mmol/L    CO2 26.6 22.0 - 29.0 mmol/L    Calcium 9.9 8.6 - 10.5 mg/dL    Total Protein 7.6 6.0 - 8.5 g/dL    Albumin 4.40 3.50 - 5.20 g/dL    ALT (SGPT) 26 1 - 41 U/L    AST (SGOT) 16 1 - 40 U/L    Alkaline Phosphatase 85 39 - 117 U/L    Total Bilirubin 0.2 0.0 - 1.2 mg/dL    Globulin 3.2 gm/dL    A/G Ratio 1.4 g/dL    BUN/Creatinine Ratio 7.9 7.0 - 25.0    Anion Gap 10.4 5.0 - 15.0 mmol/L    eGFR 107.7 >60.0 mL/min/1.73   CK-MB    Specimen: Blood   Result Value Ref Range    CKMB 3.39 " <=10.40 ng/mL   CBC Auto Differential    Specimen: Blood   Result Value Ref Range    WBC 4.74 3.40 - 10.80 10*3/mm3    RBC 5.00 4.14 - 5.80 10*6/mm3    Hemoglobin 14.4 13.0 - 17.7 g/dL    Hematocrit 43.9 37.5 - 51.0 %    MCV 87.8 79.0 - 97.0 fL    MCH 28.8 26.6 - 33.0 pg    MCHC 32.8 31.5 - 35.7 g/dL    RDW 13.2 12.3 - 15.4 %    RDW-SD 42.5 37.0 - 54.0 fl    MPV 10.2 6.0 - 12.0 fL    Platelets 353 140 - 450 10*3/mm3    Neutrophil % 40.9 (L) 42.7 - 76.0 %    Lymphocyte % 37.1 19.6 - 45.3 %    Monocyte % 19.2 (H) 5.0 - 12.0 %    Eosinophil % 1.7 0.3 - 6.2 %    Basophil % 1.1 0.0 - 1.5 %    Immature Grans % 0.0 0.0 - 0.5 %    Neutrophils, Absolute 1.94 1.70 - 7.00 10*3/mm3    Lymphocytes, Absolute 1.76 0.70 - 3.10 10*3/mm3    Monocytes, Absolute 0.91 (H) 0.10 - 0.90 10*3/mm3    Eosinophils, Absolute 0.08 0.00 - 0.40 10*3/mm3    Basophils, Absolute 0.05 0.00 - 0.20 10*3/mm3    Immature Grans, Absolute 0.00 0.00 - 0.05 10*3/mm3    nRBC 0.0 0.0 - 0.2 /100 WBC   Urinalysis With Culture If Indicated - Urine, Clean Catch    Specimen: Urine, Clean Catch   Result Value Ref Range    Color, UA Yellow Yellow, Straw    Appearance, UA Clear Clear    pH, UA 6.5 5.0 - 8.0    Specific Gravity, UA <=1.005 1.005 - 1.030    Glucose, UA Negative Negative    Ketones, UA Negative Negative    Bilirubin, UA Negative Negative    Blood, UA Small (1+) (A) Negative    Protein, UA Negative Negative    Leuk Esterase, UA Negative Negative    Nitrite, UA Negative Negative    Urobilinogen, UA 0.2 E.U./dL 0.2 - 1.0 E.U./dL   Urinalysis, Microscopic Only - Urine, Clean Catch    Specimen: Urine, Clean Catch   Result Value Ref Range    RBC, UA None Seen None Seen /HPF    WBC, UA 0-2 (A) None Seen /HPF    Bacteria, UA Trace (A) None Seen /HPF    Squamous Epithelial Cells, UA 0-2 None Seen, 0-2 /HPF    Hyaline Casts, UA None Seen None Seen /LPF    Methodology Manual Light Microscopy      Medications   bacitracin ointment 1 application (1 application Topical  Given 8/16/22 1335)   Tetanus-Diphth-Acell Pertussis (BOOSTRIX) injection 0.5 mL (0.5 mL Intramuscular Given 8/16/22 1334)   cephalexin (KEFLEX) capsule 500 mg (500 mg Oral Given 8/16/22 1334)     No results found.    Procedures/EKGs:  Procedures      Medical Decision Making:                     MDM  Number of Diagnoses or Management Options  Flank pain  Hematuria, unspecified type  Laceration of right little finger without foreign body without damage to nail, initial encounter  Diagnosis management comments: The patient presented with a laceration in need of repair. See laceration repair note for details. The wound was irrigated with copious normal saline irrigation. No sutures were used to approximate the wound edges as the laceration occurred more than 6 days ago. Wound care was provided as well as home care instructions and education. Tetanus Boostrix given. The patient tolerated the procedure well. Acute bleeding has ceased and the wound was treated in the emergency department. Patient was counseled to keep the wound clean, dry, and out of the sun. Patient was counseled to change dressings daily. Patient was advised to return to the ED for worsening erythema, pain, swelling, fever, excessive drainage or signs of infection. They were counseled to follow up for suture removal as described in the discharge instructions. Patient verbalizes understanding and agrees to follow up as instructed.    I have spoken with the patient. I have explained the patient´s condition, diagnoses and treatment plan based on the information available to me at this time. I have answered the patient's questions and addressed any concerns. The patient has a good  understanding of the patient´s diagnosis, condition, and treatment plan as can be expected at this point. The vital signs have been stable. The patient´s condition is stable and appropriate for discharge from the emergency department.      The patient will pursue further  outpatient evaluation with the primary care physician or other designated or consulting physician as outlined in the discharge instructions. They are agreeable to this plan of care and follow-up instructions have been explained in detail. The patient has received these instructions in written format and have expressed an understanding of the discharge instructions. The patient is aware that any significant change in condition or worsening of symptoms should prompt an immediate return to this or the closest emergency department or call to 911.       Amount and/or Complexity of Data Reviewed  Clinical lab tests: ordered and reviewed  Tests in the radiology section of CPT®: ordered and reviewed  Tests in the medicine section of CPT®: ordered and reviewed  Review and summarize past medical records: yes  Independent visualization of images, tracings, or specimens: yes    Risk of Complications, Morbidity, and/or Mortality  Presenting problems: moderate  Diagnostic procedures: moderate  Management options: moderate    Patient Progress  Patient progress: stable       Final diagnoses:   Laceration of right little finger without foreign body without damage to nail, initial encounter   Flank pain   Hematuria, unspecified type        Disposition:  ED Disposition     ED Disposition   Discharge    Condition   Stable    Comment   --              Chelsi Sparks, APRN  08/16/22 3630

## 2022-08-16 NOTE — DISCHARGE INSTRUCTIONS
Continue to wash your laceration at least twice daily and when soiled.  Apply the topical antibiotic ointment and keep covered.  Additionally I would like for you to take oral antibiotics.  Take until gone.  Continue to monitor the site for any worsening redness, pain, or discharge.    Your kidney function was normal today did not indicate any signs of rhabdomyolysis.  Continue drinking extra fluids and stay well-hydrated.  Monitor your urine output.  There was a small amount of blood in your urine and we will send for urine culture.  If anything abnormal comes back someone will call you and treat if necessary.    Please return to the emergency department with any new or worsening concerns.  Fever greater than 101, severe redness, swelling, foul-smelling drainage to your finger.  Return if you develop any sudden or worsening flank pain, abdominal pain, uncontrollable nausea, vomiting or diarrhea.   ,DirectAddress_Unknown,DirectAddress_Unknown

## 2022-08-17 LAB — BACTERIA SPEC AEROBE CULT: NO GROWTH

## 2023-01-23 ENCOUNTER — HOSPITAL ENCOUNTER (EMERGENCY)
Facility: HOSPITAL | Age: 48
Discharge: HOME | End: 2023-01-23
Attending: STUDENT IN AN ORGANIZED HEALTH CARE EDUCATION/TRAINING PROGRAM | Admitting: STUDENT IN AN ORGANIZED HEALTH CARE EDUCATION/TRAINING PROGRAM
Payer: MEDICAID

## 2023-01-23 ENCOUNTER — APPOINTMENT (OUTPATIENT)
Dept: GENERAL RADIOLOGY | Facility: HOSPITAL | Age: 48
End: 2023-01-23
Payer: MEDICAID

## 2023-01-23 VITALS
HEART RATE: 99 BPM | HEIGHT: 73 IN | SYSTOLIC BLOOD PRESSURE: 145 MMHG | TEMPERATURE: 98.6 F | OXYGEN SATURATION: 98 % | DIASTOLIC BLOOD PRESSURE: 97 MMHG | RESPIRATION RATE: 18 BRPM | BODY MASS INDEX: 29.82 KG/M2 | WEIGHT: 225 LBS

## 2023-01-23 VITALS
WEIGHT: 225 LBS | TEMPERATURE: 97.1 F | RESPIRATION RATE: 19 BRPM | HEART RATE: 113 BPM | HEIGHT: 73 IN | BODY MASS INDEX: 29.82 KG/M2 | SYSTOLIC BLOOD PRESSURE: 157 MMHG | DIASTOLIC BLOOD PRESSURE: 105 MMHG | OXYGEN SATURATION: 98 %

## 2023-01-23 DIAGNOSIS — F22 PARANOID BEHAVIOR: Primary | ICD-10-CM

## 2023-01-23 DIAGNOSIS — F31.9 BIPOLAR AFFECTIVE DISORDER, REMISSION STATUS UNSPECIFIED: Primary | ICD-10-CM

## 2023-01-23 LAB
ALBUMIN SERPL-MCNC: 4.9 G/DL (ref 3.5–5.2)
ALBUMIN/GLOB SERPL: 1.3 G/DL
ALP SERPL-CCNC: 90 U/L (ref 39–117)
ALT SERPL W P-5'-P-CCNC: 11 U/L (ref 1–41)
AMPHET+METHAMPHET UR QL: POSITIVE
AMPHET+METHAMPHET UR QL: POSITIVE
AMPHETAMINES UR QL: POSITIVE
AMPHETAMINES UR QL: POSITIVE
ANION GAP SERPL CALCULATED.3IONS-SCNC: 13.2 MMOL/L (ref 5–15)
AST SERPL-CCNC: 19 U/L (ref 1–40)
BACTERIA UR QL AUTO: ABNORMAL /HPF
BARBITURATES UR QL SCN: NEGATIVE
BARBITURATES UR QL SCN: NEGATIVE
BASOPHILS # BLD AUTO: 0.04 10*3/MM3 (ref 0–0.2)
BASOPHILS NFR BLD AUTO: 0.3 % (ref 0–1.5)
BENZODIAZ UR QL SCN: NEGATIVE
BENZODIAZ UR QL SCN: NEGATIVE
BILIRUB SERPL-MCNC: 0.9 MG/DL (ref 0–1.2)
BILIRUB UR QL STRIP: NEGATIVE
BUN SERPL-MCNC: 14 MG/DL (ref 6–20)
BUN/CREAT SERPL: 13.2 (ref 7–25)
BUPRENORPHINE SERPL-MCNC: NEGATIVE NG/ML
BUPRENORPHINE SERPL-MCNC: NEGATIVE NG/ML
CALCIUM SPEC-SCNC: 10.1 MG/DL (ref 8.6–10.5)
CANNABINOIDS SERPL QL: POSITIVE
CANNABINOIDS SERPL QL: POSITIVE
CHLORIDE SERPL-SCNC: 99 MMOL/L (ref 98–107)
CLARITY UR: ABNORMAL
CO2 SERPL-SCNC: 24.8 MMOL/L (ref 22–29)
COCAINE UR QL: NEGATIVE
COCAINE UR QL: NEGATIVE
COLOR UR: YELLOW
CREAT SERPL-MCNC: 1.06 MG/DL (ref 0.76–1.27)
DEPRECATED RDW RBC AUTO: 41.5 FL (ref 37–54)
EGFRCR SERPLBLD CKD-EPI 2021: 88.2 ML/MIN/1.73
EOSINOPHIL # BLD AUTO: 0.01 10*3/MM3 (ref 0–0.4)
EOSINOPHIL NFR BLD AUTO: 0.1 % (ref 0.3–6.2)
ERYTHROCYTE [DISTWIDTH] IN BLOOD BY AUTOMATED COUNT: 13.2 % (ref 12.3–15.4)
ETHANOL BLD-MCNC: 11 MG/DL (ref 0–10)
ETHANOL BLD-MCNC: <10 MG/DL (ref 0–10)
ETHANOL UR QL: 0.01 %
ETHANOL UR QL: <0.01 %
FLUAV RNA RESP QL NAA+PROBE: NOT DETECTED
FLUBV RNA RESP QL NAA+PROBE: NOT DETECTED
GLOBULIN UR ELPH-MCNC: 3.9 GM/DL
GLUCOSE SERPL-MCNC: 134 MG/DL (ref 65–99)
GLUCOSE UR STRIP-MCNC: NEGATIVE MG/DL
HCT VFR BLD AUTO: 47.2 % (ref 37.5–51)
HGB BLD-MCNC: 16.2 G/DL (ref 13–17.7)
HGB UR QL STRIP.AUTO: ABNORMAL
HOLD SPECIMEN: NORMAL
HOLD SPECIMEN: NORMAL
HYALINE CASTS UR QL AUTO: ABNORMAL /LPF
IMM GRANULOCYTES # BLD AUTO: 0.08 10*3/MM3 (ref 0–0.05)
IMM GRANULOCYTES NFR BLD AUTO: 0.5 % (ref 0–0.5)
KETONES UR QL STRIP: ABNORMAL
LEUKOCYTE ESTERASE UR QL STRIP.AUTO: NEGATIVE
LYMPHOCYTES # BLD AUTO: 1.39 10*3/MM3 (ref 0.7–3.1)
LYMPHOCYTES NFR BLD AUTO: 9 % (ref 19.6–45.3)
MAGNESIUM SERPL-MCNC: 1.8 MG/DL (ref 1.6–2.6)
MCH RBC QN AUTO: 29.6 PG (ref 26.6–33)
MCHC RBC AUTO-ENTMCNC: 34.3 G/DL (ref 31.5–35.7)
MCV RBC AUTO: 86.3 FL (ref 79–97)
METHADONE UR QL SCN: NEGATIVE
METHADONE UR QL SCN: NEGATIVE
MONOCYTES # BLD AUTO: 1.53 10*3/MM3 (ref 0.1–0.9)
MONOCYTES NFR BLD AUTO: 9.9 % (ref 5–12)
NEUTROPHILS NFR BLD AUTO: 12.48 10*3/MM3 (ref 1.7–7)
NEUTROPHILS NFR BLD AUTO: 80.2 % (ref 42.7–76)
NITRITE UR QL STRIP: NEGATIVE
NRBC BLD AUTO-RTO: 0 /100 WBC (ref 0–0.2)
OPIATES UR QL: NEGATIVE
OPIATES UR QL: NEGATIVE
OXYCODONE UR QL SCN: NEGATIVE
OXYCODONE UR QL SCN: NEGATIVE
PCP UR QL SCN: NEGATIVE
PCP UR QL SCN: NEGATIVE
PH UR STRIP.AUTO: 5.5 [PH] (ref 5–8)
PLATELET # BLD AUTO: 276 10*3/MM3 (ref 140–450)
PMV BLD AUTO: 9.9 FL (ref 6–12)
POTASSIUM SERPL-SCNC: 3.5 MMOL/L (ref 3.5–5.2)
PROPOXYPH UR QL: NEGATIVE
PROPOXYPH UR QL: NEGATIVE
PROT SERPL-MCNC: 8.8 G/DL (ref 6–8.5)
PROT UR QL STRIP: ABNORMAL
RBC # BLD AUTO: 5.47 10*6/MM3 (ref 4.14–5.8)
RBC # UR STRIP: ABNORMAL /HPF
REF LAB TEST METHOD: ABNORMAL
SARS-COV-2 RNA RESP QL NAA+PROBE: NOT DETECTED
SODIUM SERPL-SCNC: 137 MMOL/L (ref 136–145)
SP GR UR STRIP: 1.02 (ref 1–1.03)
SQUAMOUS #/AREA URNS HPF: ABNORMAL /HPF
TRICYCLICS UR QL SCN: NEGATIVE
TRICYCLICS UR QL SCN: POSITIVE
UROBILINOGEN UR QL STRIP: ABNORMAL
WBC # UR STRIP: ABNORMAL /HPF
WBC NRBC COR # BLD: 15.53 10*3/MM3 (ref 3.4–10.8)
WHOLE BLOOD HOLD COAG: NORMAL
WHOLE BLOOD HOLD SPECIMEN: NORMAL

## 2023-01-23 PROCEDURE — 36415 COLL VENOUS BLD VENIPUNCTURE: CPT

## 2023-01-23 PROCEDURE — 85025 COMPLETE CBC W/AUTO DIFF WBC: CPT | Performed by: STUDENT IN AN ORGANIZED HEALTH CARE EDUCATION/TRAINING PROGRAM

## 2023-01-23 PROCEDURE — 82077 ASSAY SPEC XCP UR&BREATH IA: CPT | Performed by: STUDENT IN AN ORGANIZED HEALTH CARE EDUCATION/TRAINING PROGRAM

## 2023-01-23 PROCEDURE — 99284 EMERGENCY DEPT VISIT MOD MDM: CPT

## 2023-01-23 PROCEDURE — 83735 ASSAY OF MAGNESIUM: CPT | Performed by: STUDENT IN AN ORGANIZED HEALTH CARE EDUCATION/TRAINING PROGRAM

## 2023-01-23 PROCEDURE — 80306 DRUG TEST PRSMV INSTRMNT: CPT | Performed by: PHYSICIAN ASSISTANT

## 2023-01-23 PROCEDURE — 80306 DRUG TEST PRSMV INSTRMNT: CPT | Performed by: STUDENT IN AN ORGANIZED HEALTH CARE EDUCATION/TRAINING PROGRAM

## 2023-01-23 PROCEDURE — 87636 SARSCOV2 & INF A&B AMP PRB: CPT | Performed by: STUDENT IN AN ORGANIZED HEALTH CARE EDUCATION/TRAINING PROGRAM

## 2023-01-23 PROCEDURE — 82077 ASSAY SPEC XCP UR&BREATH IA: CPT | Performed by: PHYSICIAN ASSISTANT

## 2023-01-23 PROCEDURE — 80053 COMPREHEN METABOLIC PANEL: CPT | Performed by: STUDENT IN AN ORGANIZED HEALTH CARE EDUCATION/TRAINING PROGRAM

## 2023-01-23 PROCEDURE — C9803 HOPD COVID-19 SPEC COLLECT: HCPCS

## 2023-01-23 PROCEDURE — 81001 URINALYSIS AUTO W/SCOPE: CPT | Performed by: STUDENT IN AN ORGANIZED HEALTH CARE EDUCATION/TRAINING PROGRAM

## 2023-01-23 RX ORDER — PROPRANOLOL HYDROCHLORIDE 20 MG/1
20 TABLET ORAL ONCE
Status: DISCONTINUED | OUTPATIENT
Start: 2023-01-23 | End: 2023-01-23

## 2023-01-23 NOTE — NURSING NOTE
Spoke to Dr. Ford, discussed assessment and labs, new orders to discharge the patient home at this time, doesn't meet admission criteria, give outpatient resources for medication refills, encourage to come back if active SI/HI ideation occur. [id number]

## 2023-01-23 NOTE — NURSING NOTE
"Pt states upon discharge he was attempting to find his way out, and he begin seeing people staring at him from the cars outside, he seen people in the trees, and unmarked police cars, and he felt he better come back inside because the paranoia was getting bad, states \"quiet frankly I am going to be kicked out of sober living\".     Again, pt is stating he is out of propanolol and Seroquel, and is discussing this with Jennifer HENDERSON at this time.   "

## 2023-01-23 NOTE — NURSING NOTE
"Pt states he is out of his psych meds, Seroquel, propanolol, states he is on Adderall and Klonopin, but he is ok on those.  States he has had some paranoia, and was taken off the Zyprexa due to Tardive dyskinesia 2.5 years ago.  Pt states he most recently had his medications filled in [facility].      Pt denies any SI, states he has one prior suicide attempt in 2015 via overdose.  Pt adamantly denies any SI at this time, states he just needs his Seroquel and Propanolol refilled.     Pt denies any HI unless others attempt to hurt him first.     Pt denies any alcohol or substance abuse on a daily basis, states he has been in/out of recovery for the last 9 years.  States when \"the shit ends up in his face he takes it\".  Pt states he is prescribed Klonopin 0.5 mg TID but hasn't had them in 3 months.     Pt rates depression 9/10 and anxiety 10/10 at this time     Pt states he does not want admission, but would like to have outpatient referral for medication refills in this area.   " JULISA CONKLIN 71y Male  MRN#: [id number]       SUBJECTIVE  Patient is a 71y old Male who presents with a chief complaint of Cerebellar bleed, DKA (20 Sep 2018 00:40)  Currently admitted to medicine with the primary diagnosis of ICH (intracerebral hemorrhage).    OBJECTIVE  PAST MEDICAL & SURGICAL HISTORY  Urinary tract infection without hematuria, site unspecified  Chronic kidney disease, unspecified CKD stage  Bilateral hearing loss, unspecified hearing loss type  Benign prostatic hyperplasia with urinary frequency  Diabetes  High cholesterol  HTN (hypertension)  Heart transplant recipient  H/O heart transplant    ALLERGIES:  codeine (Unknown)    MEDICATIONS:  STANDING MEDICATIONS  allopurinol 100 milliGRAM(s) Oral two times a day  amLODIPine Tablet 10 milliGRAM(s) Oral daily  aspirin  chewable 81 milliGRAM(s) Oral daily  atorvastatin 10 milliGRAM(s) Oral at bedtime  docusate sodium 100 milliGRAM(s) Oral two times a day  doxazosin 8 milliGRAM(s) Oral at bedtime  insulin glargine Injectable (LANTUS) 20 Unit(s) SubCutaneous every morning  insulin lispro (HumaLOG) corrective regimen sliding scale   SubCutaneous three times a day before meals  labetalol 100 milliGRAM(s) Oral two times a day  magnesium oxide 400 milliGRAM(s) Oral three times a day with meals  mycophenolate mofetil 1500 milliGRAM(s) Oral two times a day  pantoprazole    Tablet 40 milliGRAM(s) Oral before breakfast  senna 2 Tablet(s) Oral at bedtime  tacrolimus 1 milliGRAM(s) Oral every 12 hours  trimethoprim  160 mG/sulfamethoxazole 800 mG 1 Tablet(s) Oral two times a day    PRN MEDICATIONS  acetaminophen   Tablet .. 650 milliGRAM(s) Oral every 6 hours PRN  aluminum hydroxide/magnesium hydroxide/simethicone Suspension 30 milliLiter(s) Oral every 4 hours PRN  dextrose 40% Gel 15 Gram(s) Oral once PRN  glucagon  Injectable 1 milliGRAM(s) IntraMuscular once PRN      VITAL SIGNS: Last 24 Hours  T(C): 35.8 (20 Sep 2018 07:50), Max: 36.4 (19 Sep 2018 23:41)  T(F): 96.4 (20 Sep 2018 07:50), Max: 97.5 (19 Sep 2018 23:41)  HR: 99 (20 Sep 2018 07:50) (99 - 104)  BP: 142/79 (20 Sep 2018 07:50) (122/66 - 170/85)  BP(mean): --  RR: 18 (20 Sep 2018 07:50) (18 - 18)  SpO2: --    LABS:                        11.1   4.50  )-----------( 117      ( 20 Sep 2018 05:37 )             35.0     09-20    142  |  104  |  25<H>  ----------------------------<  212<H>  4.9   |  21  |  1.4    Ca    9.1      20 Sep 2018 05:37  Mg     1.7     09-20    PHYSICAL EXAM:    GENERAL: NAD, well-developed, AAOx3  HEENT:  Atraumatic, Normocephalic. EOMI, PERRLA, conjunctiva and sclera clear, No JVD  PULMONARY: Clear to auscultation bilaterally; No wheeze  CARDIOVASCULAR: Regular rate and rhythm; No murmurs, rubs, or gallops  GASTROINTESTINAL: Soft, Nontender, Nondistended  MUSCULOSKELETAL:  2+ Peripheral Pulses, No clubbing, cyanosis, or edema  NEUROLOGY: non-focal; slight instability on ambulation, possibly subjective  SKIN: No rashes or lesions

## 2023-01-23 NOTE — NURSING NOTE
Presented pt to Dr. Ford he does not think that that pt meets criteria at this time new order to discharge and provide outpatient resources [id number]

## 2023-01-23 NOTE — NURSING NOTE
Pt was adamant on leaving, states he has a friend who will pick him up, he doesn't like [place] and is going back to [place].

## 2023-01-23 NOTE — NURSING NOTE
Pt refuses chest x-ray at this time. Pt educated on the potential risks for not completing x-ray, and benefits. Pt verbalizes understanding and still refuses at this time.

## 2023-01-23 NOTE — DISCHARGE INSTRUCTIONS
Follow-up with a mental health provider at the next available appointment.  Return to the ED at any time if symptoms change or worsen.

## 2023-01-23 NOTE — ED PROVIDER NOTES
Subjective   History of Present Illness  47-year-old male who presents to the ED today for mental health evaluation.  He states he has been having issues with paranoia.  He does have a history of substance abuse as well as bipolar disorder.  He was seen here earlier this morning for a similar complaint.  He did not feel comfortable staying in the ED and requested to be discharged.  He states he did not realize that this was a way to get admitted to the [facility].  He then walked over to the [facility] and spoke with staff over there who recommended that he come back to the ED to be reevaluated.  He states he was initially dropped off by the police.  He states he feels like people and cars are watching him from the parking lot.  He states he has seen an unmarked police car and also saw people in trees.  He states he recently used methamphetamine and thinks that he may get kicked out of his sober living facility.  He denies any alcohol use.  He states his appetite has been normal but his sleep has been poor.  He states that he has ran out of his Seroquel and propranolol.  He states he ran out of his Seroquel a few days ago and his propranolol within the last couple weeks.  He states he also has a history of latent TB and is supposed to be on rifampin however he stopped taking that a few weeks ago as well.  He states he does have a productive cough.    History provided by:  Patient  Mental Health Problem  Presenting symptoms: bizarre behavior, hallucinations and paranoid behavior    Presenting symptoms: no suicidal thoughts    Degree of incapacity (severity):  Moderate  Onset quality:  Gradual  Duration:  2 weeks  Timing:  Constant  Progression:  Waxing and waning  Chronicity:  New  Context: drug abuse and noncompliance    Context: not alcohol use    Relieved by:  Nothing  Worsened by:  Nothing  Associated symptoms: anxiety and insomnia    Associated symptoms: no appetite change    Risk factors: hx of mental  illness        Review of Systems   Constitutional: Negative.  Negative for appetite change.   HENT: Negative.    Eyes: Negative.    Respiratory: Negative.    Cardiovascular: Negative.    Gastrointestinal: Negative.    Genitourinary: Negative.    Musculoskeletal: Negative.    Skin: Negative.    Neurological: Negative.    Psychiatric/Behavioral: Positive for hallucinations, paranoia and sleep disturbance. Negative for suicidal ideas. The patient is nervous/anxious and has insomnia.    All other systems reviewed and are negative.      Past Medical History:   Diagnosis Date   • Alcohol abuse    • Anxiety    • Bipolar disorder (HCC)    • Depression    • Hepatitis C infection    • Latent tuberculosis    • Mood disorder (HCC)    • PTSD (post-traumatic stress disorder)    • Suicide attempt (HCC)        No Known Allergies    Past Surgical History:   Procedure Laterality Date   • HAND SURGERY      right hand        History reviewed. No pertinent family history.    Social History     Socioeconomic History   • Marital status: Single   Tobacco Use   • Smoking status: Every Day     Packs/day: 0.50     Types: Cigarettes   • Smokeless tobacco: Never   Vaping Use   • Vaping Use: Never used   Substance and Sexual Activity   • Alcohol use: Not Currently     Alcohol/week: 2.0 standard drinks     Types: 2 Cans of beer per week   • Drug use: Yes     Types: Amphetamines, Marijuana, Methamphetamines   • Sexual activity: Yes     Partners: Female           Objective   Physical Exam  Vitals and nursing note reviewed.   Constitutional:       General: He is not in acute distress.     Appearance: Normal appearance.   HENT:      Head: Normocephalic and atraumatic.      Right Ear: External ear normal.      Left Ear: External ear normal.      Nose: Nose normal.   Eyes:      Conjunctiva/sclera: Conjunctivae normal.      Pupils: Pupils are equal, round, and reactive to light.   Cardiovascular:      Rate and Rhythm: Regular rhythm. Tachycardia  present.      Pulses: Normal pulses.      Heart sounds: Normal heart sounds.   Pulmonary:      Effort: Pulmonary effort is normal.      Breath sounds: Normal breath sounds.   Abdominal:      General: Bowel sounds are normal.      Palpations: Abdomen is soft.   Musculoskeletal:         General: Normal range of motion.      Cervical back: Normal range of motion and neck supple.   Skin:     General: Skin is warm and dry.      Capillary Refill: Capillary refill takes less than 2 seconds.   Neurological:      General: No focal deficit present.      Mental Status: He is alert and oriented to person, place, and time.   Psychiatric:         Mood and Affect: Mood is anxious.         Speech: Speech is tangential.         Behavior: Behavior is agitated. Behavior is cooperative.         Thought Content: Thought content is paranoid. Thought content does not include homicidal or suicidal ideation.         Procedures           ED Course                                           Medical Decision Making  47-year-old male presents to the ED today for mental health evaluation.  He reports issues with paranoid behavior and hallucinations.  He does have a history of methamphetamine use.  He is also out of his Seroquel and propranolol.  The patient was offered a chest x-ray due to his history of latent TB and the fact that he stopped taking his medication several weeks ago.  He refused.  He was noted to be mildly hypertensive and tachycardic.  He had been out of his propranolol for a few weeks.  A dose of that was ordered for him however he refused to take that as well.  He denied any suicidal or homicidal ideations.  Psychiatry was consulted who advised he did not meet inpatient criteria and could be discharged to follow-up outpatient.    Paranoid behavior (HCC): acute illness or injury      Final diagnoses:   Paranoid behavior (HCC)       ED Disposition  ED Disposition     ED Disposition   Discharge    Condition   Stable    Comment    --             [facility] [place] [address] [place] [address]   [place] [address] [phone]  Schedule an appointment as soon as possible for a visit in 2 days           Medication List      Stop    bacitracin 500 UNIT/GM ointment     cephalexin 500 MG capsule  Commonly known as: Jennifer White, PA  01/23/23 7047

## 2023-01-23 NOTE — ED PROVIDER NOTES
"Subjective   History of Present Illness  Patient is a 47-year-old male with significant past medical history positive for alcohol abuse, anxiety, bipolar disorder, depression, suicidal attempt presenting to the ER for psychiatric evaluation. Patient is displaying flight of ideas, paranoia, and agitation at this time. Patient states \"I was just walking towards the hopsital and these jackass police officers stopped to check my well being, I was headed to the police office to get the number to 911 or the [facility] to get help. I have been off my pysch meds for a while.\" Patient was escorted by police to hospital.  Patient denies HI or SI at this time.  Patient denies substance abuse.    History provided by:  Patient   used: No        Review of Systems   Constitutional: Negative.  Negative for fever.   HENT: Negative.    Respiratory: Negative.    Cardiovascular: Negative.  Negative for chest pain.   Gastrointestinal: Negative.  Negative for abdominal pain.   Endocrine: Negative.    Genitourinary: Negative.  Negative for dysuria.   Skin: Negative.    Neurological: Negative.    Psychiatric/Behavioral: Positive for agitation. Negative for suicidal ideas. The patient is hyperactive.    All other systems reviewed and are negative.      Past Medical History:   Diagnosis Date   • Alcohol abuse    • Anxiety    • Bipolar disorder (HCC)    • Depression    • Hepatitis C infection    • Mood disorder (HCC)    • PTSD (post-traumatic stress disorder)    • Suicide attempt (HCC)        No Known Allergies    Past Surgical History:   Procedure Laterality Date   • HAND SURGERY      right hand        History reviewed. No pertinent family history.    Social History     Socioeconomic History   • Marital status: Single   Tobacco Use   • Smoking status: Every Day     Packs/day: 0.50     Types: Cigarettes   • Smokeless tobacco: Never   Vaping Use   • Vaping Use: Never used   Substance and Sexual Activity   • Alcohol use: Not " Currently     Alcohol/week: 2.0 standard drinks     Types: 2 Cans of beer per week   • Drug use: Yes     Types: Amphetamines, Marijuana, Methamphetamines   • Sexual activity: Yes     Partners: Female           Objective   Physical Exam  Vitals and nursing note reviewed.   Constitutional:       General: He is not in acute distress.     Appearance: He is well-developed. He is not diaphoretic.   HENT:      Head: Normocephalic and atraumatic.      Right Ear: External ear normal.      Left Ear: External ear normal.      Nose: Nose normal.   Eyes:      Conjunctiva/sclera: Conjunctivae normal.      Pupils: Pupils are equal, round, and reactive to light.   Neck:      Vascular: No JVD.      Trachea: No tracheal deviation.   Cardiovascular:      Rate and Rhythm: Normal rate and regular rhythm.      Heart sounds: Normal heart sounds. No murmur heard.  Pulmonary:      Effort: Pulmonary effort is normal. No respiratory distress.      Breath sounds: Normal breath sounds. No wheezing.   Abdominal:      General: Bowel sounds are normal.      Palpations: Abdomen is soft.      Tenderness: There is no abdominal tenderness.   Musculoskeletal:         General: No deformity. Normal range of motion.      Cervical back: Normal range of motion and neck supple.   Skin:     General: Skin is warm and dry.      Coloration: Skin is not pale.      Findings: No erythema or rash.   Neurological:      Mental Status: He is alert and oriented to person, place, and time.      Cranial Nerves: No cranial nerve deficit.   Psychiatric:         Mood and Affect: Mood is elated.         Behavior: Behavior is agitated and hyperactive.         Thought Content: Thought content is paranoid. Thought content does not include homicidal or suicidal ideation. Thought content does not include homicidal or suicidal plan.         Procedures           ED Course  ED Course as of 01/23/23 0921   Mon Jan 23, 2023   0708 THC Screen, Urine(!): Positive [SM]   0708  "Methamphetamine, Ur(!): Positive [SM]   0708 Amphetamine, Urine Qual(!): Positive [SM]   0708 Tricyclic Antidepressants Screen(!): Positive [SM]   0755 Endorsed to Jennifer Escobar PA-C at shift change.  [SM]      ED Course User Index  [SM] Moises Lyla B, OZZY                                           Medical Decision Making  History of Present Illness  Patient is a 47-year-old male with significant past medical history positive for alcohol abuse, anxiety, bipolar disorder, depression, suicidal attempt presenting to the ER for psychiatric evaluation. Patient is displaying flight of ideas, paranoia, and agitation at this time. Patient states \"I was just walking towards the hopsital and these [place] police officers stopped to check my well being, I was headed to the police office to get the number to 911 or the [facility] to get help. I have been off my pysch meds for a while.\" Patient was escorted by police to hospital.  Patient denies HI or SI at this time.  Patient denies substance abuse.  Patient reports being out of his Seroquel and propranolol.  He adamantly denies any suicidal or homicidal ideations.  He does not want admission to the [facility], he just wants outpatient referral for medication refill.  Psychiatry was consulted who advised the patient can be discharged with outpatient follow-up.          Bipolar affective disorder, remission status unspecified (HCC): acute illness or injury  Amount and/or Complexity of Data Reviewed  Labs: ordered. Decision-making details documented in ED Course.          Final diagnoses:   Bipolar affective disorder, remission status unspecified (HCC)       ED Disposition  ED Disposition     ED Disposition   Discharge    Condition   Stable    Comment   --             [facility] [place] [address] [place] Greeting Card [facility] [address] [phone]  Schedule an appointment as soon as possible for a visit in 2 days           Medication " List      No changes were made to your prescriptions during this visit.          Jennifer Escobar PA  01/23/23 0788

## 2023-01-23 NOTE — NURSING NOTE
Pt assessment complete.     Pt reports coming here today because he is out of his psych medications. Seroquel which he has not had in 2 days and propanolol which he has not had in 2 weeks, he states he is also on Adderall and Klonopin.     Pt denies sihi/avh   He said he is paranoid by just people in general.     He states he has been in and out of recovery for 9 years but denies any alcohol or drug use other than meth which he last used today 1/23/23.   Dep 9  anx 10   Pt is rambling, and irritable throughout assessment.

## 2023-03-16 ENCOUNTER — HOSPITAL ENCOUNTER (EMERGENCY)
Facility: HOSPITAL | Age: 48
Discharge: PSYCHIATRIC OR SUBSTANCE USE FACILITY | DRG: 885 | End: 2023-03-16
Attending: EMERGENCY MEDICINE | Admitting: EMERGENCY MEDICINE
Payer: MEDICAID

## 2023-03-16 ENCOUNTER — HOSPITAL ENCOUNTER (INPATIENT)
Facility: HOSPITAL | Age: 48
LOS: 1 days | Discharge: LEFT BEFORE DISCHARGE | DRG: 885 | End: 2023-03-17
Attending: PSYCHIATRY & NEUROLOGY | Admitting: PSYCHIATRY & NEUROLOGY
Payer: MEDICAID

## 2023-03-16 VITALS
OXYGEN SATURATION: 99 % | HEART RATE: 86 BPM | TEMPERATURE: 98.7 F | RESPIRATION RATE: 18 BRPM | DIASTOLIC BLOOD PRESSURE: 86 MMHG | SYSTOLIC BLOOD PRESSURE: 135 MMHG | BODY MASS INDEX: 29.82 KG/M2 | WEIGHT: 225 LBS | HEIGHT: 73 IN

## 2023-03-16 DIAGNOSIS — F22 PARANOIA: Primary | ICD-10-CM

## 2023-03-16 PROBLEM — R45.851 SUICIDAL IDEATION: Status: ACTIVE | Noted: 2023-03-16

## 2023-03-16 LAB
ALBUMIN SERPL-MCNC: 4.8 G/DL (ref 3.5–5.2)
ALBUMIN/GLOB SERPL: 1.4 G/DL
ALP SERPL-CCNC: 91 U/L (ref 39–117)
ALT SERPL W P-5'-P-CCNC: 20 U/L (ref 1–41)
AMPHET+METHAMPHET UR QL: NEGATIVE
AMPHETAMINES UR QL: NEGATIVE
ANION GAP SERPL CALCULATED.3IONS-SCNC: 10.7 MMOL/L (ref 5–15)
AST SERPL-CCNC: 16 U/L (ref 1–40)
BARBITURATES UR QL SCN: NEGATIVE
BASOPHILS # BLD AUTO: 0.08 10*3/MM3 (ref 0–0.2)
BASOPHILS NFR BLD AUTO: 0.8 % (ref 0–1.5)
BENZODIAZ UR QL SCN: NEGATIVE
BILIRUB SERPL-MCNC: 0.2 MG/DL (ref 0–1.2)
BILIRUB UR QL STRIP: NEGATIVE
BUN SERPL-MCNC: 13 MG/DL (ref 6–20)
BUN/CREAT SERPL: 14.3 (ref 7–25)
BUPRENORPHINE SERPL-MCNC: NEGATIVE NG/ML
CALCIUM SPEC-SCNC: 9.9 MG/DL (ref 8.6–10.5)
CANNABINOIDS SERPL QL: POSITIVE
CHLORIDE SERPL-SCNC: 105 MMOL/L (ref 98–107)
CLARITY UR: CLEAR
CO2 SERPL-SCNC: 25.3 MMOL/L (ref 22–29)
COCAINE UR QL: NEGATIVE
COLOR UR: YELLOW
CREAT SERPL-MCNC: 0.91 MG/DL (ref 0.76–1.27)
DEPRECATED RDW RBC AUTO: 40.6 FL (ref 37–54)
EGFRCR SERPLBLD CKD-EPI 2021: 105.9 ML/MIN/1.73
EOSINOPHIL # BLD AUTO: 0.12 10*3/MM3 (ref 0–0.4)
EOSINOPHIL NFR BLD AUTO: 1.2 % (ref 0.3–6.2)
ERYTHROCYTE [DISTWIDTH] IN BLOOD BY AUTOMATED COUNT: 12.9 % (ref 12.3–15.4)
ETHANOL BLD-MCNC: <10 MG/DL (ref 0–10)
ETHANOL UR QL: <0.01 %
FLUAV RNA RESP QL NAA+PROBE: NOT DETECTED
FLUBV RNA ISLT QL NAA+PROBE: NOT DETECTED
GLOBULIN UR ELPH-MCNC: 3.4 GM/DL
GLUCOSE SERPL-MCNC: 115 MG/DL (ref 65–99)
GLUCOSE UR STRIP-MCNC: NEGATIVE MG/DL
HCT VFR BLD AUTO: 45.3 % (ref 37.5–51)
HGB BLD-MCNC: 15.3 G/DL (ref 13–17.7)
HGB UR QL STRIP.AUTO: NEGATIVE
IMM GRANULOCYTES # BLD AUTO: 0.03 10*3/MM3 (ref 0–0.05)
IMM GRANULOCYTES NFR BLD AUTO: 0.3 % (ref 0–0.5)
KETONES UR QL STRIP: NEGATIVE
LEUKOCYTE ESTERASE UR QL STRIP.AUTO: NEGATIVE
LYMPHOCYTES # BLD AUTO: 2.29 10*3/MM3 (ref 0.7–3.1)
LYMPHOCYTES NFR BLD AUTO: 22.7 % (ref 19.6–45.3)
MAGNESIUM SERPL-MCNC: 2 MG/DL (ref 1.6–2.6)
MCH RBC QN AUTO: 29.1 PG (ref 26.6–33)
MCHC RBC AUTO-ENTMCNC: 33.8 G/DL (ref 31.5–35.7)
MCV RBC AUTO: 86.3 FL (ref 79–97)
METHADONE UR QL SCN: NEGATIVE
MONOCYTES # BLD AUTO: 0.95 10*3/MM3 (ref 0.1–0.9)
MONOCYTES NFR BLD AUTO: 9.4 % (ref 5–12)
NEUTROPHILS NFR BLD AUTO: 6.61 10*3/MM3 (ref 1.7–7)
NEUTROPHILS NFR BLD AUTO: 65.6 % (ref 42.7–76)
NITRITE UR QL STRIP: NEGATIVE
NRBC BLD AUTO-RTO: 0 /100 WBC (ref 0–0.2)
OPIATES UR QL: NEGATIVE
OXYCODONE UR QL SCN: NEGATIVE
PCP UR QL SCN: NEGATIVE
PH UR STRIP.AUTO: 6 [PH] (ref 5–8)
PLATELET # BLD AUTO: 318 10*3/MM3 (ref 140–450)
PMV BLD AUTO: 10.4 FL (ref 6–12)
POTASSIUM SERPL-SCNC: 3.9 MMOL/L (ref 3.5–5.2)
PROPOXYPH UR QL: NEGATIVE
PROT SERPL-MCNC: 8.2 G/DL (ref 6–8.5)
PROT UR QL STRIP: NEGATIVE
RBC # BLD AUTO: 5.25 10*6/MM3 (ref 4.14–5.8)
SARS-COV-2 RNA RESP QL NAA+PROBE: NOT DETECTED
SODIUM SERPL-SCNC: 141 MMOL/L (ref 136–145)
SP GR UR STRIP: 1.02 (ref 1–1.03)
TRICYCLICS UR QL SCN: NEGATIVE
UROBILINOGEN UR QL STRIP: NORMAL
WBC NRBC COR # BLD: 10.08 10*3/MM3 (ref 3.4–10.8)

## 2023-03-16 PROCEDURE — 80053 COMPREHEN METABOLIC PANEL: CPT | Performed by: EMERGENCY MEDICINE

## 2023-03-16 PROCEDURE — 81003 URINALYSIS AUTO W/O SCOPE: CPT | Performed by: EMERGENCY MEDICINE

## 2023-03-16 PROCEDURE — 93005 ELECTROCARDIOGRAM TRACING: CPT | Performed by: PSYCHIATRY & NEUROLOGY

## 2023-03-16 PROCEDURE — 87636 SARSCOV2 & INF A&B AMP PRB: CPT | Performed by: EMERGENCY MEDICINE

## 2023-03-16 PROCEDURE — 36415 COLL VENOUS BLD VENIPUNCTURE: CPT

## 2023-03-16 PROCEDURE — 99285 EMERGENCY DEPT VISIT HI MDM: CPT

## 2023-03-16 PROCEDURE — 83735 ASSAY OF MAGNESIUM: CPT | Performed by: EMERGENCY MEDICINE

## 2023-03-16 PROCEDURE — 85025 COMPLETE CBC W/AUTO DIFF WBC: CPT | Performed by: EMERGENCY MEDICINE

## 2023-03-16 PROCEDURE — 82077 ASSAY SPEC XCP UR&BREATH IA: CPT | Performed by: EMERGENCY MEDICINE

## 2023-03-16 PROCEDURE — 80306 DRUG TEST PRSMV INSTRMNT: CPT | Performed by: EMERGENCY MEDICINE

## 2023-03-16 RX ORDER — HYDROXYZINE PAMOATE 25 MG/1
25 CAPSULE ORAL 3 TIMES DAILY PRN
COMMUNITY

## 2023-03-16 RX ORDER — HYDROXYZINE HYDROCHLORIDE 25 MG/1
25 TABLET, FILM COATED ORAL 3 TIMES DAILY PRN
Status: DISCONTINUED | OUTPATIENT
Start: 2023-03-16 | End: 2023-03-16

## 2023-03-16 RX ORDER — NICOTINE 21 MG/24HR
1 PATCH, TRANSDERMAL 24 HOURS TRANSDERMAL
Status: DISCONTINUED | OUTPATIENT
Start: 2023-03-17 | End: 2023-03-17 | Stop reason: HOSPADM

## 2023-03-16 RX ORDER — PROPRANOLOL HCL 60 MG
60 CAPSULE, EXTENDED RELEASE 24HR ORAL DAILY
Status: DISCONTINUED | OUTPATIENT
Start: 2023-03-17 | End: 2023-03-17 | Stop reason: HOSPADM

## 2023-03-16 RX ORDER — RIFAMPIN 300 MG/1
300 CAPSULE ORAL 2 TIMES DAILY
COMMUNITY

## 2023-03-16 RX ORDER — QUETIAPINE FUMARATE 100 MG/1
200 TABLET, FILM COATED ORAL NIGHTLY
Status: DISCONTINUED | OUTPATIENT
Start: 2023-03-16 | End: 2023-03-17 | Stop reason: HOSPADM

## 2023-03-16 RX ORDER — ALUMINA, MAGNESIA, AND SIMETHICONE 2400; 2400; 240 MG/30ML; MG/30ML; MG/30ML
15 SUSPENSION ORAL EVERY 6 HOURS PRN
Status: DISCONTINUED | OUTPATIENT
Start: 2023-03-16 | End: 2023-03-17 | Stop reason: HOSPADM

## 2023-03-16 RX ORDER — ECHINACEA PURPUREA EXTRACT 125 MG
2 TABLET ORAL AS NEEDED
Status: DISCONTINUED | OUTPATIENT
Start: 2023-03-16 | End: 2023-03-17 | Stop reason: HOSPADM

## 2023-03-16 RX ORDER — IBUPROFEN 400 MG/1
400 TABLET ORAL EVERY 6 HOURS PRN
Status: DISCONTINUED | OUTPATIENT
Start: 2023-03-16 | End: 2023-03-17 | Stop reason: HOSPADM

## 2023-03-16 RX ORDER — CLONIDINE HYDROCHLORIDE 0.1 MG/1
0.1 TABLET ORAL 3 TIMES DAILY PRN
Status: DISCONTINUED | OUTPATIENT
Start: 2023-03-16 | End: 2023-03-17 | Stop reason: HOSPADM

## 2023-03-16 RX ORDER — RIFAMPIN 300 MG/1
300 CAPSULE ORAL 2 TIMES DAILY
Status: DISCONTINUED | OUTPATIENT
Start: 2023-03-16 | End: 2023-03-17 | Stop reason: HOSPADM

## 2023-03-16 RX ORDER — LOSARTAN POTASSIUM 50 MG/1
50 TABLET ORAL DAILY
COMMUNITY

## 2023-03-16 RX ORDER — LOPERAMIDE HYDROCHLORIDE 2 MG/1
2 CAPSULE ORAL
Status: DISCONTINUED | OUTPATIENT
Start: 2023-03-16 | End: 2023-03-17 | Stop reason: HOSPADM

## 2023-03-16 RX ORDER — ACETAMINOPHEN 325 MG/1
650 TABLET ORAL EVERY 6 HOURS PRN
Status: DISCONTINUED | OUTPATIENT
Start: 2023-03-16 | End: 2023-03-17 | Stop reason: HOSPADM

## 2023-03-16 RX ORDER — BENZTROPINE MESYLATE 1 MG/1
2 TABLET ORAL ONCE AS NEEDED
Status: DISCONTINUED | OUTPATIENT
Start: 2023-03-16 | End: 2023-03-17 | Stop reason: HOSPADM

## 2023-03-16 RX ORDER — TRAZODONE HYDROCHLORIDE 50 MG/1
50 TABLET ORAL NIGHTLY PRN
Status: DISCONTINUED | OUTPATIENT
Start: 2023-03-16 | End: 2023-03-17 | Stop reason: HOSPADM

## 2023-03-16 RX ORDER — BENZONATATE 100 MG/1
100 CAPSULE ORAL 3 TIMES DAILY PRN
Status: DISCONTINUED | OUTPATIENT
Start: 2023-03-16 | End: 2023-03-17 | Stop reason: HOSPADM

## 2023-03-16 RX ORDER — CLONIDINE HYDROCHLORIDE 0.1 MG/1
0.1 TABLET ORAL 3 TIMES DAILY PRN
COMMUNITY

## 2023-03-16 RX ORDER — DIPHENOXYLATE HYDROCHLORIDE AND ATROPINE SULFATE 2.5; .025 MG/1; MG/1
1 TABLET ORAL DAILY
COMMUNITY

## 2023-03-16 RX ORDER — FAMOTIDINE 20 MG/1
20 TABLET, FILM COATED ORAL 2 TIMES DAILY PRN
Status: DISCONTINUED | OUTPATIENT
Start: 2023-03-16 | End: 2023-03-17 | Stop reason: HOSPADM

## 2023-03-16 RX ORDER — BENZTROPINE MESYLATE 1 MG/ML
1 INJECTION INTRAMUSCULAR; INTRAVENOUS ONCE AS NEEDED
Status: DISCONTINUED | OUTPATIENT
Start: 2023-03-16 | End: 2023-03-17 | Stop reason: HOSPADM

## 2023-03-16 RX ORDER — ONDANSETRON 4 MG/1
4 TABLET, FILM COATED ORAL EVERY 6 HOURS PRN
Status: DISCONTINUED | OUTPATIENT
Start: 2023-03-16 | End: 2023-03-17 | Stop reason: HOSPADM

## 2023-03-16 RX ORDER — LOSARTAN POTASSIUM 50 MG/1
50 TABLET ORAL DAILY
Status: DISCONTINUED | OUTPATIENT
Start: 2023-03-17 | End: 2023-03-17 | Stop reason: HOSPADM

## 2023-03-16 RX ORDER — DIPHENOXYLATE HYDROCHLORIDE AND ATROPINE SULFATE 2.5; .025 MG/1; MG/1
1 TABLET ORAL DAILY
Status: DISCONTINUED | OUTPATIENT
Start: 2023-03-17 | End: 2023-03-17 | Stop reason: HOSPADM

## 2023-03-16 RX ORDER — QUETIAPINE FUMARATE 200 MG/1
200 TABLET, FILM COATED ORAL NIGHTLY
COMMUNITY

## 2023-03-16 RX ORDER — HYDROXYZINE 50 MG/1
50 TABLET, FILM COATED ORAL EVERY 6 HOURS PRN
Status: DISCONTINUED | OUTPATIENT
Start: 2023-03-16 | End: 2023-03-17 | Stop reason: HOSPADM

## 2023-03-16 RX ORDER — PROPRANOLOL HCL 60 MG
60 CAPSULE, EXTENDED RELEASE 24HR ORAL DAILY
COMMUNITY

## 2023-03-16 RX ADMIN — TRAZODONE HYDROCHLORIDE 50 MG: 50 TABLET ORAL at 21:50

## 2023-03-16 RX ADMIN — HYDROXYZINE HYDROCHLORIDE 50 MG: 50 TABLET, FILM COATED ORAL at 21:50

## 2023-03-16 RX ADMIN — QUETIAPINE FUMARATE 200 MG: 100 TABLET ORAL at 22:54

## 2023-03-16 NOTE — ED PROVIDER NOTES
"Subjective   History of Present Illness  Patient is 47-year-old male who presents from [facility] for psychiatric evaluation, states \"I am having a paranoid episode\".  He states that this is been waxing and waning for about a week, worse today.  He denies suicidal homicidal ideations, auditory or visual hallucinations, other symptoms or other complaints.  He states that he is at [facility] to recover from methamphetamine and marijuana abuse.        Review of Systems   All other systems reviewed and are negative.      Past Medical History:   Diagnosis Date   • Alcohol abuse    • Anxiety    • Bipolar disorder (HCC)    • Depression    • Hepatitis C infection    • Latent tuberculosis    • Mood disorder (HCC)    • PTSD (post-traumatic stress disorder)    • Suicide attempt (HCC)        No Known Allergies    Past Surgical History:   Procedure Laterality Date   • HAND SURGERY      right hand        History reviewed. No pertinent family history.    Social History     Socioeconomic History   • Marital status: Single   Tobacco Use   • Smoking status: Every Day     Packs/day: 0.50     Types: Cigarettes   • Smokeless tobacco: Never   Vaping Use   • Vaping Use: Never used   Substance and Sexual Activity   • Alcohol use: Not Currently     Alcohol/week: 2.0 standard drinks     Types: 2 Cans of beer per week   • Drug use: Yes     Types: Amphetamines, Marijuana, Methamphetamines   • Sexual activity: Yes     Partners: Female           Objective   Physical Exam  Vitals and nursing note reviewed.   Constitutional:       General: He is not in acute distress.     Appearance: Normal appearance. He is well-developed. He is not ill-appearing, toxic-appearing or diaphoretic.   HENT:      Head: Normocephalic and atraumatic.   Eyes:      General: No scleral icterus.     Pupils: Pupils are equal, round, and reactive to light.   Neck:      Trachea: No tracheal deviation.   Cardiovascular:      Rate and Rhythm: Normal rate and regular " rhythm.   Pulmonary:      Effort: Pulmonary effort is normal. No respiratory distress.      Breath sounds: Normal breath sounds.   Chest:      Chest wall: No tenderness.   Abdominal:      General: Bowel sounds are normal.      Palpations: Abdomen is soft.      Tenderness: There is no abdominal tenderness. There is no guarding or rebound.   Musculoskeletal:         General: No tenderness. Normal range of motion.      Cervical back: Normal range of motion and neck supple. No rigidity or tenderness.      Right lower leg: No edema.      Left lower leg: No edema.   Skin:     General: Skin is warm and dry.      Capillary Refill: Capillary refill takes less than 2 seconds.      Coloration: Skin is not pale.   Neurological:      General: No focal deficit present.      Mental Status: He is alert and oriented to person, place, and time.      GCS: GCS eye subscore is 4. GCS verbal subscore is 5. GCS motor subscore is 6.      Motor: No abnormal muscle tone.   Psychiatric:         Mood and Affect: Mood normal.         Behavior: Behavior normal.         Procedures           ED Course  ED Course as of 03/19/23 1010   Thu Mar 16, 2023   2173 Case discussed with and care endorsed to Dr. Abad at shift change.  Electronically signed by Mynor Yan MD, 03/16/23, 6:53 PM EDT.   [CM]      ED Course User Index  [CM] Mynor Yan MD                                           [facility]    Final diagnoses:   Paranoia (HCC)       ED Disposition  ED Disposition     ED Disposition   DC/Transfer to Behavioral Health    Condition   --    Comment   --             No follow-up provider specified.       Medication List      No changes were made to your prescriptions during this visit.       Please note that portions of this note were completed with a voice recognition program.        Mynor Yan MD  03/19/23 1010

## 2023-03-16 NOTE — NURSING NOTE
"Pt states having a paranoid episode that he's very hostile and hes struggling with medications not helping, feeling hopeless.    Pt reports not getting the attention or what he needs at [facility] when he met with the practitioner.     Pt states went to [facility] for probation and is sanctioned to finish.    Pt was doing meth and marijuana states hasn't used       Pt admits to SI with plan to OD, admits to hx of suicide attempts.       pt states couldn't name person in particular and states he just felt they had some \"distaste\" towards him or \"whispering among themselves\" about him. Pt states he felt threatened and imminent danger and felt he may need to \"defend\" himself however had no intentions of killing anyone or hurting someone unless he needed to protect himself.    Pt was institutionalized for 15 years in intermediate and saw a lot of stuff and  Has been out since 2012    Pt reports \"no one in specific im having a paranoid episode\"    Pt is on rivampin for latent TB.    Pt denies AVH.    Depression-9/10  Anxiety-10/10    Pt reports poor sleep and appetite.  "

## 2023-03-16 NOTE — NURSING NOTE
Spoke to doctor Michelle intake information labs and V/S provided and discussed with provider, instructed to admit with routine SP3 orders [id number]. Patient and ER provider made aware of admitting orders and plan of care.

## 2023-03-17 VITALS
HEART RATE: 84 BPM | OXYGEN SATURATION: 96 % | BODY MASS INDEX: 28.86 KG/M2 | RESPIRATION RATE: 18 BRPM | DIASTOLIC BLOOD PRESSURE: 94 MMHG | SYSTOLIC BLOOD PRESSURE: 142 MMHG | TEMPERATURE: 98.1 F | WEIGHT: 217.8 LBS | HEIGHT: 73 IN

## 2023-03-17 PROBLEM — F60.89 CLUSTER B PERSONALITY DISORDER (HCC): Status: ACTIVE | Noted: 2019-09-23

## 2023-03-17 LAB
QT INTERVAL: 392 MS
QTC INTERVAL: 425 MS

## 2023-03-17 PROCEDURE — 99236 HOSP IP/OBS SAME DATE HI 85: CPT | Performed by: PSYCHIATRY & NEUROLOGY

## 2023-03-17 RX ADMIN — HYDROXYZINE HYDROCHLORIDE 50 MG: 50 TABLET, FILM COATED ORAL at 14:07

## 2023-03-17 RX ADMIN — CLONIDINE HYDROCHLORIDE 0.1 MG: 0.1 TABLET ORAL at 14:08
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
## 2023-03-17 NOTE — DISCHARGE SUMMARY
:  1975  MRN:  [id number]  Visit Number:  [id number]      Date of Admission:3/16/2023   Date of Discharge:  3/17/2023    Discharge Diagnosis:  Principal Problem:    Mood disorder (HCC)  Active Problems:    Methamphetamine abuse (HCC)    Paranoid delusion (HCC)    Cluster B personality disorder (HCC)    Suicidal ideation    Latent tuberculosis    Hypertension        Admission Diagnosis:  Suicidal ideation [R45.851]     HPI  Inocencio Joy is a 47 y.o. male who was admitted on 3/16/2023 with complaints of feeling paranoid and felt that people were against him at the rehab he has been for the last ten days.   For details please see H&P dated 3/17/23.    Hospital Course  Patient is a 47 y.o. male presented with anger and irritability with suicidal and homicidal thoughts though no one in particular. The patient was admitted to adult psych unit for safety, further evaluation and treatment. The patient.The patient exhibited pattern of affective instability and maladaptive coping skills indicative of underlying cluster B personality disorder with antisocial and borderline traits. The patient was very agitated initially but was able to calm down and talk about his past struggles and was tearful describing his past. The patient was receptive and agreed that he was very sensitive to rejection and despite his best efforts to make people happy he felt frustrated and angry it would lead him to feel suicidal or wanting to hurt others but no one in particular. The patient was able to process some of his feelings and calmed down. But later in the day he again became agitated and was not happy that he couldn't smoke and demanded to leave. He was encouraged to stay and let his medications be adjusted and work further on his coping skills. The patient was adamant on leaving and his plan was to go back to rehab. The patient denied thoughts of harm to self or others and didn't meet criteria for a hold and per his insistence he  "was discharged AMA.    Mental Status Exam upon discharge:   Mood \"irritable\"   Affect-congruent, appropriate, stable  Thought Content-goal directed, no delusional material present  Thought process-linear, organized.  Suicidality: No SI  Homicidality: No HI  Perception: No AH/VH    Procedures Performed         Consults:   Consults     No orders found for last 30 day(s).          Pertinent Test Results:   Admission on 03/16/2023   Component Date Value Ref Range Status   • QT Interval 03/17/2023 392  ms Preliminary   • QTC Interval 03/17/2023 425  ms Preliminary   Admission on 03/16/2023, Discharged on 03/16/2023   Component Date Value Ref Range Status   • Glucose 03/16/2023 115 (H)  65 - 99 mg/dL Final   • BUN 03/16/2023 13  6 - 20 mg/dL Final   • Creatinine 03/16/2023 0.91  0.76 - 1.27 mg/dL Final   • Sodium 03/16/2023 141  136 - 145 mmol/L Final   • Potassium 03/16/2023 3.9  3.5 - 5.2 mmol/L Final   • Chloride 03/16/2023 105  98 - 107 mmol/L Final   • CO2 03/16/2023 25.3  22.0 - 29.0 mmol/L Final   • Calcium 03/16/2023 9.9  8.6 - 10.5 mg/dL Final   • Total Protein 03/16/2023 8.2  6.0 - 8.5 g/dL Final   • Albumin 03/16/2023 4.8  3.5 - 5.2 g/dL Final   • ALT (SGPT) 03/16/2023 20  1 - 41 U/L Final   • AST (SGOT) 03/16/2023 16  1 - 40 U/L Final   • Alkaline Phosphatase 03/16/2023 91  39 - 117 U/L Final   • Total Bilirubin 03/16/2023 0.2  0.0 - 1.2 mg/dL Final   • Globulin 03/16/2023 3.4  gm/dL Final   • A/G Ratio 03/16/2023 1.4  g/dL Final   • BUN/Creatinine Ratio 03/16/2023 14.3  7.0 - 25.0 Final   • Anion Gap 03/16/2023 10.7  5.0 - 15.0 mmol/L Final   • eGFR 03/16/2023 105.9  >60.0 mL/min/1.73 Final   • Color, UA 03/16/2023 Yellow  Yellow, Straw Final   • Appearance, UA 03/16/2023 Clear  Clear Final   • pH, UA 03/16/2023 6.0  5.0 - 8.0 Final   • Specific Gravity, UA 03/16/2023 1.023  1.005 - 1.030 Final   • Glucose, UA 03/16/2023 Negative  Negative Final   • Ketones, UA 03/16/2023 Negative  Negative Final   • " Bilirubin, UA 03/16/2023 Negative  Negative Final   • Blood, UA 03/16/2023 Negative  Negative Final   • Protein, UA 03/16/2023 Negative  Negative Final   • Leuk Esterase, UA 03/16/2023 Negative  Negative Final   • Nitrite, UA 03/16/2023 Negative  Negative Final   • Urobilinogen, UA 03/16/2023 0.2 E.U./dL  0.2 - 1.0 E.U./dL Final   • THC, Screen, Urine 03/16/2023 Positive (A)  Negative Final   • Phencyclidine (PCP), Urine 03/16/2023 Negative  Negative Final   • Cocaine Screen, Urine 03/16/2023 Negative  Negative Final   • Methamphetamine, Ur 03/16/2023 Negative  Negative Final   • Opiate Screen 03/16/2023 Negative  Negative Final   • Amphetamine Screen, Urine 03/16/2023 Negative  Negative Final   • Benzodiazepine Screen, Urine 03/16/2023 Negative  Negative Final   • Tricyclic Antidepressants Screen 03/16/2023 Negative  Negative Final   • Methadone Screen, Urine 03/16/2023 Negative  Negative Final   • Barbiturates Screen, Urine 03/16/2023 Negative  Negative Final   • Oxycodone Screen, Urine 03/16/2023 Negative  Negative Final   • Propoxyphene Screen 03/16/2023 Negative  Negative Final   • Buprenorphine, Screen, Urine 03/16/2023 Negative  Negative Final   • Ethanol 03/16/2023 <10  0 - 10 mg/dL Final   • Ethanol % 03/16/2023 <0.010  % Final   • Magnesium 03/16/2023 2.0  1.6 - 2.6 mg/dL Final   • WBC 03/16/2023 10.08  3.40 - 10.80 10*3/mm3 Final   • RBC 03/16/2023 5.25  4.14 - 5.80 10*6/mm3 Final   • Hemoglobin 03/16/2023 15.3  13.0 - 17.7 g/dL Final   • Hematocrit 03/16/2023 45.3  37.5 - 51.0 % Final   • MCV 03/16/2023 86.3  79.0 - 97.0 fL Final   • MCH 03/16/2023 29.1  26.6 - 33.0 pg Final   • MCHC 03/16/2023 33.8  31.5 - 35.7 g/dL Final   • RDW 03/16/2023 12.9  12.3 - 15.4 % Final   • RDW-SD 03/16/2023 40.6  37.0 - 54.0 fl Final   • MPV 03/16/2023 10.4  6.0 - 12.0 fL Final   • Platelets 03/16/2023 318  140 - 450 10*3/mm3 Final   • Neutrophil % 03/16/2023 65.6  42.7 - 76.0 % Final   • Lymphocyte % 03/16/2023 22.7  19.6  - 45.3 % Final   • Monocyte % 03/16/2023 9.4  5.0 - 12.0 % Final   • Eosinophil % 03/16/2023 1.2  0.3 - 6.2 % Final   • Basophil % 03/16/2023 0.8  0.0 - 1.5 % Final   • Immature Grans % 03/16/2023 0.3  0.0 - 0.5 % Final   • Neutrophils, Absolute 03/16/2023 6.61  1.70 - 7.00 10*3/mm3 Final   • Lymphocytes, Absolute 03/16/2023 2.29  0.70 - 3.10 10*3/mm3 Final   • Monocytes, Absolute 03/16/2023 0.95 (H)  0.10 - 0.90 10*3/mm3 Final   • Eosinophils, Absolute 03/16/2023 0.12  0.00 - 0.40 10*3/mm3 Final   • Basophils, Absolute 03/16/2023 0.08  0.00 - 0.20 10*3/mm3 Final   • Immature Grans, Absolute 03/16/2023 0.03  0.00 - 0.05 10*3/mm3 Final   • nRBC 03/16/2023 0.0  0.0 - 0.2 /100 WBC Final   • COVID19 03/16/2023 Not Detected  Not Detected - Ref. Range Final   • Influenza A PCR 03/16/2023 Not Detected  Not Detected Final   • Influenza B PCR 03/16/2023 Not Detected  Not Detected Final        Condition on Discharge:  guarded    Vital Signs  Temp:  [97.8 °F (36.6 °C)-98.7 °F (37.1 °C)] 98.1 °F (36.7 °C)  Heart Rate:  [] 84  Resp:  [18-20] 18  BP: (121-172)/(79-97) 142/94      Discharge Disposition:  Left Against Medical Advice    Discharge Medications:     Discharge Medications      Continue These Medications      Instructions Start Date   cloNIDine 0.1 MG tablet  Commonly known as: CATAPRES   0.1 mg, Oral, 3 Times Daily PRN      Daily Vites tablet tablet   1 tablet, Oral, Daily      hydrOXYzine pamoate 25 MG capsule  Commonly known as: VISTARIL   25 mg, Oral, 3 Times Daily PRN      losartan 50 MG tablet  Commonly known as: COZAAR   50 mg, Oral, Daily      propranolol LA 60 MG 24 hr capsule  Commonly known as: INDERAL LA   60 mg, Oral, Daily      QUEtiapine 200 MG tablet  Commonly known as: SEROquel   200 mg, Oral, Nightly      rifAMPin 300 MG capsule  Commonly known as: RIFADIN   300 mg, Oral, 2 Times Daily             Discharge Diet: Regular     Activity at Discharge: As tolerated     Follow-up Appointments  No future  appointments.        Time spent in discharge: > 30 min    Clinician:   Benjamin Davalos MD  03/17/23  15:48 EDT

## 2023-03-17 NOTE — PROGRESS NOTES
[id number]    Therapist met with patient and therapist Radha Woody. Patient has been demanding and verbally aggressive at nurses station. Therapist staffed with Dr. Davalos. Met with patient and offered him two options 1. Stay another day and work towards discharge over the weekend with Dr. Davalos or if he insists he will be permitted to discharge AMA today.  Patient reports that he wants to discharge AMA today, though he did appear frustrated with the AMA decision. Therapist provided education and support. Therapist's support, or attempt too, was often cut off by the patient.  Patient was over heared on the phone with [facility] and is working on their staff to pick him up. Patient reports that he is craving a cigarette and would like to leave.  At this time, patient's behaviors and history appear to indicate a pattern of Cluster B personality disorder, and issues with emotion regulation, anger outbursts, and following rules.  He is currently on probation and residing at [facility].  He was agreeable to return to [facility] this date.  Patient would not sign consent for [facility], but agreeable to come into therapist's office in 15 minutes to call them to arrange transport. Patient denies SI/HI/AVH.  He denies acute symptoms.  He does not appear to meet criteria for hold or transfer at this time.     Assisted patient in identifying risk factors which would indicate the need for higher level of care including thoughts to harm self or others and/or self-harming behavior and encouraged patient to call [phone], call 911, or present to the nearest emergency room should any of these events occur. Discussed crisis intervention services and means to access; patient is at [facility] which is a secure facility.  Patient adamantly and convincingly denies current suicidal or homicidal ideation or perceptual disturbance.    Patient ended up coming back to therapist office and talking a lot about his past and how being  angry has been a coping mechanism of sorts.  He has struggled being at [facility] with other peers and sometimes feels that they are talking about him or plotting. He was able to redirect these thoughts in session and was open talking about Cluster B personality traits. Therapist encouraged patient to focus on CBT and DBT going forward and we talked about how distress tolerance can help.  Patient receptive and apologized for her earlier behavior. He contacted April at [facility] [place] who was agreeable to send transportation later today.      Therapist completed the following safety plan with the patient:      SAFETY/MENTAL HEALTH PLAN    1. Recognizing warning signs: Warning signs that a crisis may be developing such as thoughts, images, mood, situation, behaviors:     When I'm not feeling safe to myself and others     2. Internal coping strategies: Things that the patient can do to take their mind off problems without contacting another person such as relaxation techniques, physical activity, etc:    Participate in meetings, try to be teachable.     3. Socialization strategies for distraction and support: People and social settings that provide distraction or support - names or places, telephone:     Lelo Braxton     4. Social contact for assistance in resolving crisis: People the patient can ask for help - names and telephone:    Lelo Braxton     5. Professionals or agencies contacts to help resolve crisis: Professionals or agencies the patient can contact during a crisis - clinician name/location/phone/emergency contact number, local urgent care services with address/phone, National Suicide Prevention Lifeline (930), Emergency contact 911:       988, nearest ER     6. Means restriction: Ways to make the environment safe    Patient at secure [facility], denies access to firearms

## 2023-03-17 NOTE — PLAN OF CARE
Goal Outcome Evaluation:               Patient was discharged AMA. The risks of leaving AMA, as well as, the benefits of remaining hospitalized was explained to the patient. Patient verbalized understanding. Patient denies SI/HI upon discharge.

## 2023-03-17 NOTE — PLAN OF CARE
Problem: Adult Behavioral Health Plan of Care  Goal: Patient-Specific Goal (Individualization)  Outcome: Ongoing, Progressing  Flowsheets  Taken 3/17/2023 1412  Patient-Specific Goals (Include Timeframe): Therapist to offer 1-4 therapy sessions, aftercare planning, safety planning, family education, group therapy, and brief CBT/MI interventions.  Individualized Care Needs: Patient needs coordination of aftercare with [facility] or another facility but is refusing aftercare and refusing meds.  Anxieties, Fears or Concerns: Patient expresses paranoia about another patient that was admitted here after he was.  Taken 3/17/2023 1319  Patient Personal Strengths:   expressive of emotions   expressive of needs   motivated for treatment   positive vocational history   self-reliant  Patient Vulnerabilities:   adverse childhood experience(s)   history of unsuccessful treatment   lacks insight into illness   limited support system   occupational insecurity   poor impulse control   substance abuse/addiction     Problem: Adult Behavioral Health Plan of Care  Goal: Optimized Coping Skills in Response to Life Stressors  Outcome: Ongoing, Progressing  Flowsheets (Taken 3/17/2023 1412)  Optimized Coping Skills in Response to Life Stressors: unable to achieve outcome  Intervention: Promote Effective Coping Strategies  Flowsheets (Taken 3/17/2023 1412)  Supportive Measures:   active listening utilized   counseling provided   positive reinforcement provided   verbalization of feelings encouraged     Problem: Adult Behavioral Health Plan of Care  Goal: Optimized Coping Skills in Response to Life Stressors  Intervention: Promote Effective Coping Strategies  Flowsheets (Taken 3/17/2023 1412)  Supportive Measures:   active listening utilized   counseling provided   positive reinforcement provided   verbalization of feelings encouraged     Problem: Adult Behavioral Health Plan of Care  Goal: Optimized Coping Skills in Response to Life  Stressors  Intervention: Promote Effective Coping Strategies  Flowsheets (Taken 3/17/2023 1412)  Supportive Measures:   active listening utilized   counseling provided   positive reinforcement provided   verbalization of feelings encouraged     Problem: Adult Behavioral Health Plan of Care  Goal: Develops/Participates in Therapeutic [place] to Support Successful Transition  Outcome: Ongoing, Progressing  Flowsheets (Taken 3/17/2023 1412)  Develops/Participates in Therapeutic [place] to Support Successful Transition: making progress toward outcome  Intervention: Foster Therapeutic [place]  Flowsheets (Taken 3/17/2023 1412)  Trust Relationship/Rapport:   care explained   choices provided   emotional support provided   empathic listening provided   questions answered   questions encouraged   thoughts/feelings acknowledged  Intervention: Mutually Develop Transition Plan  Flowsheets  Taken 3/17/2023 1412  Outpatient/Agency/Support Group Needs: residential services  Transition Support: follow-up care discussed  Anticipated Discharge Disposition: residential substance use unit  Taken 3/17/2023 1407  Discharge Coordination/Progress: Patient has thus far refused to sign NATACHA for [facility], though he may choose to go back there.  He also is restless, irritable, asked doctor about leaving today.  Dr. Davalos indicated it would be AMA if he left today, but to stay overnight and if things go well tonight and tomorrow he can be released tomorrow.  Concerns Comments: Patient presenting with continued paranoia, lacks insight about paranoia.  Transportation Anticipated: agency  Transportation Concerns: no car  Current Discharge Risk:   lack of support system/caregiver   psychiatric illness   substance use/abuse  Concerns to be Addressed:   substance/tobacco abuse/use   mental health  Readmission Within the Last 30 Days: no previous admission in last 30 days  Patient/Family Anticipated Services at Transition: rehabilitation  services  Patient's Choice of Community Agency(s): Patient is refusing to sign [facility] or make choice.  Patient/Family Anticipates Transition to: inpatient rehabilitation facility  Offered/Gave Vendor List: yes     Problem: Adult Behavioral Health Plan of Care  Goal: Develops/Participates in Therapeutic [place] to Support Successful Transition  Outcome: Ongoing, Progressing  Flowsheets (Taken 3/17/2023 1412)  Develops/Participates in Therapeutic [place] to Support Successful Transition: making progress toward outcome     Problem: Adult Behavioral Health Plan of Care  Goal: Develops/Participates in Therapeutic [place] to Support Successful Transition  Intervention: Foster Therapeutic [place]  Flowsheets (Taken 3/17/2023 1412)  Trust Relationship/Rapport:   care explained   choices provided   emotional support provided   empathic listening provided   questions answered   questions encouraged   thoughts/feelings acknowledged     Problem: Adult Behavioral Health Plan of Care  Goal: Develops/Participates in Therapeutic [place] to Support Successful Transition  Intervention: Foster Therapeutic [place]  Flowsheets (Taken 3/17/2023 1412)  Trust Relationship/Rapport:   care explained   choices provided   emotional support provided   empathic listening provided   questions answered   questions encouraged   thoughts/feelings acknowledged     Problem: Adult Behavioral Health Plan of Care  Goal: Develops/Participates in Therapeutic [place] to Support Successful Transition  Intervention: Mutually Develop Transition Plan  Flowsheets  Taken 3/17/2023 1412  Outpatient/Agency/Support Group Needs: residential services  Transition Support: follow-up care discussed  Anticipated Discharge Disposition: residential substance use unit  Taken 3/17/2023 1407  Discharge Coordination/Progress: Patient has thus far refused to sign NATACHA for [facility], though he may choose to go back there.  He also is restless, irritable, asked doctor  about leaving today.  Dr. Davalos indicated it would be AMA if he left today, but to stay overnight and if things go well tonight and tomorrow he can be released tomorrow.  Concerns Comments: Patient presenting with continued paranoia, lacks insight about paranoia.  Transportation Anticipated: agency  Transportation Concerns: no car  Current Discharge Risk:   lack of support system/caregiver   psychiatric illness   substance use/abuse  Concerns to be Addressed:   substance/tobacco abuse/use   mental health  Readmission Within the Last 30 Days: no previous admission in last 30 days  Patient/Family Anticipated Services at Transition: rehabilitation services  Patient's Choice of Community Agency(s): Patient is refusing to sign [facility] or make choice.  Patient/Family Anticipates Transition to: inpatient rehabilitation facility  Offered/Gave Vendor List: yes   Goal Outcome Evaluation:    Data:  Therapist reviewed Dr. Davalos's assessment, discussed patient with nursing staff and met with patient this date to further discuss patient progress, review healthy coping and safe disposition.  Pt presents as agitated, paranoid, and wanting to leave AMA by end of today's social history intake.  Refused to sign NATACHA for [facility] or any other agency, therapist advised I cannot coordinate aftercare without [facility].  He asked me to contact Dr. Davalos who requested he stay another day and would release him tomorrow if all goes well.  Patient became very agitated, requested to go home AMA.  Went to the nurses station, escalated verbally, security was called to monitor him.  Staffed case with LESLIE Villegas, who aided in de-escalation of patient.  He continued to refuse to sign NATACHA for [facility], she contacted Dr. Davalos who indicated he could be released AMA.  Patient was agitated about this, initially verbally agreed to sign [facility]ACHA for [facility], then refused again to sign after reading.  Patient finally agreeable to call [facility] himself in the presence of Felicia Tony therapist.  Patient denies HI, denies SI, denies auditory and visual hallucinations.    Clinical Maneuvering/Intervention:    Therapist assisted patient in processing above session content; acknowledged and normalized patient's thoughts, feelings and concerns.  Encouraged patient to discuss/vent feelings, frustrations, and fears concerning their ongoing issues and validated patients feelings.  Discussed the importance of healthy coping and reviewed healthy coping skills such as distraction, thought reframing/redirecting, grounding, mindfulness, etc.  Reviewed safe disposition with patient.    Assessment:  Patient is a 46-year-old white male admitted to the inpatient unit for crisis stabilization.  At time of assessment patient is alert and oriented x4, patient denies any ongoing suicidal ideation and denies any homicidal ideation.  Patient became very agitated after assessment and after learning Dr. Kelly would not release him unless it was AMA.  Patient's affect appears restricted, psychomotor behavior restless.  Did not report any change in anxiety and depression today.  Observed to have intense eye contact, demonstrates some paranoia but was not grossly delusional in presentation.  Demonstrates fair insight, poor impulse control, fair judgment.  Though initially agitated and escalated to the point that security had to be contacted, patient de-escalated by learning he could contact [facility] and discharge AMA.    Plan:  Patient will likely discharge AMA today with hope to go to [facility] by his own arrangement.      This document signed by Radha Woody LCSW, March 17, 2023, 14:23 EDT

## 2023-03-17 NOTE — NURSING NOTE
"Patient came to the nursing station demanding to leave. Patient states, \"I signed in voluntarily so I can sign myself out.\" Staff attempted to verbally redirect patient. Patient became angry and began to curse loudly at staff. Security was called to the unit.  MD, Dr. Davalos, was contacted and made aware of patient's request. Patient is currently attempting to call to find himself a ride from the hospital.   "

## 2023-03-17 NOTE — CONSULTS
Visited with Inocencio this morning for at least a half an hour. He was very coherent and talked at length about his experiences with rehab. He has been in rehab of some kind 30 times in the last ten years and each time he has gotten in his own way when it came to working the program. He knows that it is time to surrender and admit that he cannot beat his addiction by himself, but knowledge is only half the joshi. I encouraged him that his feelings of helplessness in the face of his addiction could be the beginning stages of the surrender he needs to make. Prayed with him. Will follow as needed.

## 2023-03-18 ENCOUNTER — HOSPITAL ENCOUNTER (EMERGENCY)
Facility: HOSPITAL | Age: 48
Discharge: LEFT BEFORE DISCHARGE | End: 2023-03-19
Payer: MEDICAID

## 2023-03-18 VITALS
HEART RATE: 77 BPM | SYSTOLIC BLOOD PRESSURE: 145 MMHG | WEIGHT: 228.62 LBS | TEMPERATURE: 97.5 F | HEIGHT: 73 IN | RESPIRATION RATE: 20 BRPM | BODY MASS INDEX: 30.3 KG/M2 | OXYGEN SATURATION: 99 % | DIASTOLIC BLOOD PRESSURE: 81 MMHG

## 2023-03-18 LAB
ALBUMIN SERPL-MCNC: 4.2 G/DL (ref 3.5–5.2)
ALBUMIN/GLOB SERPL: 1.5 G/DL
ALP SERPL-CCNC: 101 U/L (ref 39–117)
ALT SERPL W P-5'-P-CCNC: 17 U/L (ref 1–41)
ANION GAP SERPL CALCULATED.3IONS-SCNC: 11.4 MMOL/L (ref 5–15)
AST SERPL-CCNC: 12 U/L (ref 1–40)
BASOPHILS # BLD AUTO: 0.07 10*3/MM3 (ref 0–0.2)
BASOPHILS NFR BLD AUTO: 0.9 % (ref 0–1.5)
BILIRUB SERPL-MCNC: <0.2 MG/DL (ref 0–1.2)
BUN SERPL-MCNC: 11 MG/DL (ref 6–20)
BUN/CREAT SERPL: 13.8 (ref 7–25)
CALCIUM SPEC-SCNC: 9.4 MG/DL (ref 8.6–10.5)
CHLORIDE SERPL-SCNC: 103 MMOL/L (ref 98–107)
CO2 SERPL-SCNC: 25.6 MMOL/L (ref 22–29)
CREAT SERPL-MCNC: 0.8 MG/DL (ref 0.76–1.27)
DEPRECATED RDW RBC AUTO: 40.4 FL (ref 37–54)
EGFRCR SERPLBLD CKD-EPI 2021: 111.2 ML/MIN/1.73
EOSINOPHIL # BLD AUTO: 0.23 10*3/MM3 (ref 0–0.4)
EOSINOPHIL NFR BLD AUTO: 2.9 % (ref 0.3–6.2)
ERYTHROCYTE [DISTWIDTH] IN BLOOD BY AUTOMATED COUNT: 13.1 % (ref 12.3–15.4)
GLOBULIN UR ELPH-MCNC: 2.8 GM/DL
GLUCOSE SERPL-MCNC: 97 MG/DL (ref 65–99)
HCT VFR BLD AUTO: 42.4 % (ref 37.5–51)
HGB BLD-MCNC: 14.4 G/DL (ref 13–17.7)
IMM GRANULOCYTES # BLD AUTO: 0.02 10*3/MM3 (ref 0–0.05)
IMM GRANULOCYTES NFR BLD AUTO: 0.3 % (ref 0–0.5)
LYMPHOCYTES # BLD AUTO: 3.18 10*3/MM3 (ref 0.7–3.1)
LYMPHOCYTES NFR BLD AUTO: 40.4 % (ref 19.6–45.3)
MCH RBC QN AUTO: 29.1 PG (ref 26.6–33)
MCHC RBC AUTO-ENTMCNC: 34 G/DL (ref 31.5–35.7)
MCV RBC AUTO: 85.7 FL (ref 79–97)
MONOCYTES # BLD AUTO: 0.87 10*3/MM3 (ref 0.1–0.9)
MONOCYTES NFR BLD AUTO: 11.1 % (ref 5–12)
NEUTROPHILS NFR BLD AUTO: 3.5 10*3/MM3 (ref 1.7–7)
NEUTROPHILS NFR BLD AUTO: 44.4 % (ref 42.7–76)
NRBC BLD AUTO-RTO: 0 /100 WBC (ref 0–0.2)
PLATELET # BLD AUTO: 283 10*3/MM3 (ref 140–450)
PMV BLD AUTO: 10 FL (ref 6–12)
POTASSIUM SERPL-SCNC: 4 MMOL/L (ref 3.5–5.2)
PROT SERPL-MCNC: 7 G/DL (ref 6–8.5)
RBC # BLD AUTO: 4.95 10*6/MM3 (ref 4.14–5.8)
SODIUM SERPL-SCNC: 140 MMOL/L (ref 136–145)
WBC NRBC COR # BLD: 7.87 10*3/MM3 (ref 3.4–10.8)

## 2023-03-18 PROCEDURE — 85025 COMPLETE CBC W/AUTO DIFF WBC: CPT | Performed by: EMERGENCY MEDICINE

## 2023-03-18 PROCEDURE — 93005 ELECTROCARDIOGRAM TRACING: CPT | Performed by: EMERGENCY MEDICINE

## 2023-03-18 PROCEDURE — 36415 COLL VENOUS BLD VENIPUNCTURE: CPT | Performed by: EMERGENCY MEDICINE

## 2023-03-18 PROCEDURE — 99211 OFF/OP EST MAY X REQ PHY/QHP: CPT

## 2023-03-18 PROCEDURE — 93010 ELECTROCARDIOGRAM REPORT: CPT | Performed by: INTERNAL MEDICINE

## 2023-03-18 PROCEDURE — 80053 COMPREHEN METABOLIC PANEL: CPT | Performed by: EMERGENCY MEDICINE

## 2023-03-18 NOTE — PAYOR COMM NOTE
"Racquel Dudley (47 y.o. Male)     Date of Birth   1975    Social Security Number       Address   [address] [id number]    Home Phone   [phone]    MRN   [id number]       [place]   None    Marital Status   Single                            Admission Date   3/16/23    Admission Type   Emergency    Admitting Provider   Joe Michelle MD    Attending Provider       Department, Room/Bed   [facility] ADULT PSYCHIATRIC, 1014/1S       Discharge Date   3/17/2023    Discharge Disposition   Left Against Medical Advice    Discharge Destination                               Attending Provider: (none)   Allergies: No Known Allergies    Isolation: None   Infection: None   Code Status: Prior    Ht: 185.4 cm (73\")   Wt: 98.8 kg (217 lb 12.8 oz)    Admission Cmt: None   Principal Problem: Mood disorder (HCC) [F39]                 Active Insurance as of 3/16/2023     Primary Coverage     Payor Plan Insurance Group Employer/Plan Group    ANTHEM MEDICAID [employer] MEDICAID [id number]     Payor Plan Address Payor Plan Phone Number Payor Plan Fax Number Effective Dates    [address] [address] [phone]  2019 - None Entered    [facility] [id number]       Subscriber Name Subscriber Birth Date Member ID       RACQUEL DUDLEY 1975 [id number]                 Emergency Contacts          No emergency contacts on file.        PLEASE ATTACH THIS DISCHARGE INFORMATION TO AUTH. # [id number]    PT DISCHARGED AMA ON 2023    Discharge Diagnosis:  Principal Problem:    Mood disorder (F39)  Active Problems:    Methamphetamine abuse (F15.10)    Paranoid delusion (F22)    Cluster B personality disorder (F60.89)    Suicidal ideation    Latent tuberculosis    Hypertension    FOLLOW UP:     Patient to return to [facility] at discharge.              Discharge Summary      Benjamin Davalos MD at 23 1548          :  1975  MRN:  [id number]  Visit Number:  [id number]      Date of " "Admission:3/16/2023   Date of Discharge:  3/17/2023    Discharge Diagnosis:  Principal Problem:    Mood disorder (HCC)  Active Problems:    Methamphetamine abuse (HCC)    Paranoid delusion (HCC)    Cluster B personality disorder (HCC)    Suicidal ideation    Latent tuberculosis    Hypertension        Admission Diagnosis:  Suicidal ideation [R45.851]     [facility]  Inocencio Joy is a 47 y.o. male who was admitted on 3/16/2023 with complaints of feeling paranoid and felt that people were against him at the rehab he has been for the last ten days.   For details please see H&P dated 3/17/23.    Hospital Course  Patient is a 47 y.o. male presented with anger and irritability with suicidal and homicidal thoughts though no one in particular. The patient was admitted to adult psych unit for safety, further evaluation and treatment. The patient.The patient exhibited pattern of affective instability and maladaptive coping skills indicative of underlying cluster B personality disorder with antisocial and borderline traits. The patient was very agitated initially but was able to calm down and talk about his past struggles and was tearful describing his past. The patient was receptive and agreed that he was very sensitive to rejection and despite his best efforts to make people happy he felt frustrated and angry it would lead him to feel suicidal or wanting to hurt others but no one in particular. The patient was able to process some of his feelings and calmed down. But later in the day he again became agitated and was not happy that he couldn't smoke and demanded to leave. He was encouraged to stay and let his medications be adjusted and work further on his coping skills. The patient was adamant on leaving and his plan was to go back to rehab. The patient denied thoughts of harm to self or others and didn't meet criteria for a hold and per his insistence he was discharged AMA.    Mental Status Exam upon discharge:   Mood \"irritable\" "   Affect-congruent, appropriate, stable  Thought Content-goal directed, no delusional material present  Thought process-linear, organized.  Suicidality: No SI  Homicidality: No HI  Perception: No AH/VH    Procedures Performed         Consults:   Consults     No orders found for last 30 day(s).          Pertinent Test Results:   Admission on 03/16/2023   Component Date Value Ref Range Status   • QT Interval 03/17/2023 392  ms Preliminary   • QTC Interval 03/17/2023 425  ms Preliminary   Admission on 03/16/2023, Discharged on 03/16/2023   Component Date Value Ref Range Status   • Glucose 03/16/2023 115 (H)  65 - 99 mg/dL Final   • BUN 03/16/2023 13  6 - 20 mg/dL Final   • Creatinine 03/16/2023 0.91  0.76 - 1.27 mg/dL Final   • Sodium 03/16/2023 141  136 - 145 mmol/L Final   • Potassium 03/16/2023 3.9  3.5 - 5.2 mmol/L Final   • Chloride 03/16/2023 105  98 - 107 mmol/L Final   • CO2 03/16/2023 25.3  22.0 - 29.0 mmol/L Final   • Calcium 03/16/2023 9.9  8.6 - 10.5 mg/dL Final   • Total Protein 03/16/2023 8.2  6.0 - 8.5 g/dL Final   • Albumin 03/16/2023 4.8  3.5 - 5.2 g/dL Final   • ALT (SGPT) 03/16/2023 20  1 - 41 U/L Final   • AST (SGOT) 03/16/2023 16  1 - 40 U/L Final   • Alkaline Phosphatase 03/16/2023 91  39 - 117 U/L Final   • Total Bilirubin 03/16/2023 0.2  0.0 - 1.2 mg/dL Final   • Globulin 03/16/2023 3.4  gm/dL Final   • A/G Ratio 03/16/2023 1.4  g/dL Final   • BUN/Creatinine Ratio 03/16/2023 14.3  7.0 - 25.0 Final   • Anion Gap 03/16/2023 10.7  5.0 - 15.0 mmol/L Final   • eGFR 03/16/2023 105.9  >60.0 mL/min/1.73 Final   • Color, UA 03/16/2023 Yellow  Yellow, Straw Final   • Appearance, UA 03/16/2023 Clear  Clear Final   • pH, UA 03/16/2023 6.0  5.0 - 8.0 Final   • Specific Gravity, UA 03/16/2023 1.023  1.005 - 1.030 Final   • Glucose, UA 03/16/2023 Negative  Negative Final   • Ketones, UA 03/16/2023 Negative  Negative Final   • Bilirubin, UA 03/16/2023 Negative  Negative Final   • Blood, UA 03/16/2023 Negative   Negative Final   • Protein, UA 03/16/2023 Negative  Negative Final   • Leuk Esterase, UA 03/16/2023 Negative  Negative Final   • Nitrite, UA 03/16/2023 Negative  Negative Final   • Urobilinogen, UA 03/16/2023 0.2 E.U./dL  0.2 - 1.0 E.U./dL Final   • THC, Screen, Urine 03/16/2023 Positive (A)  Negative Final   • Phencyclidine (PCP), Urine 03/16/2023 Negative  Negative Final   • Cocaine Screen, Urine 03/16/2023 Negative  Negative Final   • Methamphetamine, Ur 03/16/2023 Negative  Negative Final   • Opiate Screen 03/16/2023 Negative  Negative Final   • Amphetamine Screen, Urine 03/16/2023 Negative  Negative Final   • Benzodiazepine Screen, Urine 03/16/2023 Negative  Negative Final   • Tricyclic Antidepressants Screen 03/16/2023 Negative  Negative Final   • Methadone Screen, Urine 03/16/2023 Negative  Negative Final   • Barbiturates Screen, Urine 03/16/2023 Negative  Negative Final   • Oxycodone Screen, Urine 03/16/2023 Negative  Negative Final   • Propoxyphene Screen 03/16/2023 Negative  Negative Final   • Buprenorphine, Screen, Urine 03/16/2023 Negative  Negative Final   • Ethanol 03/16/2023 <10  0 - 10 mg/dL Final   • Ethanol % 03/16/2023 <0.010  % Final   • Magnesium 03/16/2023 2.0  1.6 - 2.6 mg/dL Final   • WBC 03/16/2023 10.08  3.40 - 10.80 10*3/mm3 Final   • RBC 03/16/2023 5.25  4.14 - 5.80 10*6/mm3 Final   • Hemoglobin 03/16/2023 15.3  13.0 - 17.7 g/dL Final   • Hematocrit 03/16/2023 45.3  37.5 - 51.0 % Final   • MCV 03/16/2023 86.3  79.0 - 97.0 fL Final   • MCH 03/16/2023 29.1  26.6 - 33.0 pg Final   • MCHC 03/16/2023 33.8  31.5 - 35.7 g/dL Final   • RDW 03/16/2023 12.9  12.3 - 15.4 % Final   • RDW-SD 03/16/2023 40.6  37.0 - 54.0 fl Final   • MPV 03/16/2023 10.4  6.0 - 12.0 fL Final   • Platelets 03/16/2023 318  140 - 450 10*3/mm3 Final   • Neutrophil % 03/16/2023 65.6  42.7 - 76.0 % Final   • Lymphocyte % 03/16/2023 22.7  19.6 - 45.3 % Final   • Monocyte % 03/16/2023 9.4  5.0 - 12.0 % Final   • Eosinophil %  03/16/2023 1.2  0.3 - 6.2 % Final   • Basophil % 03/16/2023 0.8  0.0 - 1.5 % Final   • Immature Grans % 03/16/2023 0.3  0.0 - 0.5 % Final   • Neutrophils, Absolute 03/16/2023 6.61  1.70 - 7.00 10*3/mm3 Final   • Lymphocytes, Absolute 03/16/2023 2.29  0.70 - 3.10 10*3/mm3 Final   • Monocytes, Absolute 03/16/2023 0.95 (H)  0.10 - 0.90 10*3/mm3 Final   • Eosinophils, Absolute 03/16/2023 0.12  0.00 - 0.40 10*3/mm3 Final   • Basophils, Absolute 03/16/2023 0.08  0.00 - 0.20 10*3/mm3 Final   • Immature Grans, Absolute 03/16/2023 0.03  0.00 - 0.05 10*3/mm3 Final   • nRBC 03/16/2023 0.0  0.0 - 0.2 /100 WBC Final   • COVID19 03/16/2023 Not Detected  Not Detected - Ref. Range Final   • Influenza A PCR 03/16/2023 Not Detected  Not Detected Final   • Influenza B PCR 03/16/2023 Not Detected  Not Detected Final        Condition on Discharge:  guarded    Vital Signs  Temp:  [97.8 °F (36.6 °C)-98.7 °F (37.1 °C)] 98.1 °F (36.7 °C)  Heart Rate:  [] 84  Resp:  [18-20] 18  BP: (121-172)/(79-97) 142/94      Discharge Disposition:  Left Against Medical Advice    Discharge Medications:     Discharge Medications      Continue These Medications      Instructions Start Date   cloNIDine 0.1 MG tablet  Commonly known as: CATAPRES   0.1 mg, Oral, 3 Times Daily PRN      Daily Vites tablet tablet   1 tablet, Oral, Daily      hydrOXYzine pamoate 25 MG capsule  Commonly known as: VISTARIL   25 mg, Oral, 3 Times Daily PRN      losartan 50 MG tablet  Commonly known as: COZAAR   50 mg, Oral, Daily      propranolol LA 60 MG 24 hr capsule  Commonly known as: INDERAL LA   60 mg, Oral, Daily      QUEtiapine 200 MG tablet  Commonly known as: SEROquel   200 mg, Oral, Nightly      rifAMPin 300 MG capsule  Commonly known as: RIFADIN   300 mg, Oral, 2 Times Daily             Discharge Diet: Regular     Activity at Discharge: As tolerated     Follow-up Appointments  No future appointments.        Time spent in discharge: > 30 min    Clinician:   Benjamin  MD Anoop  03/17/23  15:48 EDT    Electronically signed by Benjamin Davalos MD at 03/17/23 3341

## 2023-03-19 ENCOUNTER — HOSPITAL ENCOUNTER (INPATIENT)
Facility: HOSPITAL | Age: 48
LOS: 2 days | Discharge: HOME | DRG: 885 | End: 2023-03-21
Attending: PSYCHIATRY & NEUROLOGY | Admitting: PSYCHIATRY & NEUROLOGY
Payer: MEDICAID

## 2023-03-19 ENCOUNTER — HOSPITAL ENCOUNTER (EMERGENCY)
Facility: HOSPITAL | Age: 48
Discharge: PSYCHIATRIC OR SUBSTANCE USE FACILITY | DRG: 885 | End: 2023-03-19
Attending: EMERGENCY MEDICINE | Admitting: EMERGENCY MEDICINE
Payer: MEDICAID

## 2023-03-19 VITALS
OXYGEN SATURATION: 97 % | HEART RATE: 64 BPM | RESPIRATION RATE: 12 BRPM | SYSTOLIC BLOOD PRESSURE: 112 MMHG | DIASTOLIC BLOOD PRESSURE: 59 MMHG | TEMPERATURE: 97.6 F

## 2023-03-19 DIAGNOSIS — F41.9 ANXIETY: ICD-10-CM

## 2023-03-19 DIAGNOSIS — F31.9 BIPOLAR AFFECTIVE DISORDER, REMISSION STATUS UNSPECIFIED: Primary | ICD-10-CM

## 2023-03-19 PROBLEM — F29 PSYCHOSIS: Status: ACTIVE | Noted: 2023-03-19

## 2023-03-19 LAB
AMPHET+METHAMPHET UR QL: NEGATIVE
APAP SERPL-MCNC: <5 MCG/ML (ref 0–30)
BARBITURATES UR QL SCN: NEGATIVE
BENZODIAZ UR QL SCN: NEGATIVE
CANNABINOIDS SERPL QL: POSITIVE
COCAINE UR QL: NEGATIVE
ETHANOL BLD-MCNC: <10 MG/DL (ref 0–10)
ETHANOL UR QL: <0.01 %
METHADONE UR QL SCN: NEGATIVE
OPIATES UR QL: POSITIVE
OXYCODONE UR QL SCN: NEGATIVE
QT INTERVAL: 384 MS
SALICYLATES SERPL-MCNC: <0.3 MG/DL

## 2023-03-19 PROCEDURE — 80307 DRUG TEST PRSMV CHEM ANLYZR: CPT | Performed by: EMERGENCY MEDICINE

## 2023-03-19 PROCEDURE — 80143 DRUG ASSAY ACETAMINOPHEN: CPT | Performed by: EMERGENCY MEDICINE

## 2023-03-19 PROCEDURE — 99284 EMERGENCY DEPT VISIT MOD MDM: CPT

## 2023-03-19 PROCEDURE — 99283 EMERGENCY DEPT VISIT LOW MDM: CPT

## 2023-03-19 PROCEDURE — 80179 DRUG ASSAY SALICYLATE: CPT | Performed by: EMERGENCY MEDICINE

## 2023-03-19 PROCEDURE — 82077 ASSAY SPEC XCP UR&BREATH IA: CPT | Performed by: EMERGENCY MEDICINE

## 2023-03-19 PROCEDURE — 36415 COLL VENOUS BLD VENIPUNCTURE: CPT

## 2023-03-19 RX ORDER — DIPHENHYDRAMINE HCL 50 MG
50 CAPSULE ORAL EVERY 4 HOURS PRN
Status: DISCONTINUED | OUTPATIENT
Start: 2023-03-19 | End: 2023-03-22 | Stop reason: HOSPADM

## 2023-03-19 RX ORDER — LOPERAMIDE HYDROCHLORIDE 2 MG/1
2 CAPSULE ORAL 4 TIMES DAILY PRN
Status: DISCONTINUED | OUTPATIENT
Start: 2023-03-19 | End: 2023-03-22 | Stop reason: HOSPADM

## 2023-03-19 RX ORDER — HALOPERIDOL 5 MG/1
5 TABLET ORAL EVERY 4 HOURS PRN
Status: DISCONTINUED | OUTPATIENT
Start: 2023-03-19 | End: 2023-03-22 | Stop reason: HOSPADM

## 2023-03-19 RX ORDER — LORAZEPAM 2 MG/ML
2 INJECTION INTRAMUSCULAR EVERY 4 HOURS PRN
Status: DISCONTINUED | OUTPATIENT
Start: 2023-03-19 | End: 2023-03-22 | Stop reason: HOSPADM

## 2023-03-19 RX ORDER — LOPERAMIDE HYDROCHLORIDE 2 MG/1
4 CAPSULE ORAL ONCE AS NEEDED
Status: DISCONTINUED | OUTPATIENT
Start: 2023-03-19 | End: 2023-03-22 | Stop reason: HOSPADM

## 2023-03-19 RX ORDER — CLONIDINE HYDROCHLORIDE 0.1 MG/1
0.1 TABLET ORAL 3 TIMES DAILY
Status: DISCONTINUED | OUTPATIENT
Start: 2023-03-19 | End: 2023-03-20

## 2023-03-19 RX ORDER — PROMETHAZINE HYDROCHLORIDE 25 MG/1
25 TABLET ORAL EVERY 6 HOURS PRN
Status: DISCONTINUED | OUTPATIENT
Start: 2023-03-19 | End: 2023-03-22 | Stop reason: HOSPADM

## 2023-03-19 RX ORDER — LORAZEPAM 2 MG/1
2 TABLET ORAL EVERY 4 HOURS PRN
Status: DISCONTINUED | OUTPATIENT
Start: 2023-03-19 | End: 2023-03-22 | Stop reason: HOSPADM

## 2023-03-19 RX ORDER — HALOPERIDOL 5 MG/ML
5 INJECTION INTRAMUSCULAR EVERY 4 HOURS PRN
Status: DISCONTINUED | OUTPATIENT
Start: 2023-03-19 | End: 2023-03-22 | Stop reason: HOSPADM

## 2023-03-19 RX ORDER — NICOTINE 21 MG/24HR
1 PATCH, TRANSDERMAL 24 HOURS TRANSDERMAL
Status: DISCONTINUED | OUTPATIENT
Start: 2023-03-19 | End: 2023-03-20

## 2023-03-19 RX ORDER — TRAZODONE HYDROCHLORIDE 50 MG/1
100 TABLET ORAL NIGHTLY PRN
Status: DISCONTINUED | OUTPATIENT
Start: 2023-03-19 | End: 2023-03-22 | Stop reason: HOSPADM

## 2023-03-19 RX ORDER — DICYCLOMINE HYDROCHLORIDE 10 MG/1
20 CAPSULE ORAL EVERY 6 HOURS PRN
Status: DISCONTINUED | OUTPATIENT
Start: 2023-03-19 | End: 2023-03-22 | Stop reason: HOSPADM

## 2023-03-19 RX ORDER — DIPHENHYDRAMINE HYDROCHLORIDE 50 MG/ML
50 INJECTION INTRAMUSCULAR; INTRAVENOUS EVERY 4 HOURS PRN
Status: DISCONTINUED | OUTPATIENT
Start: 2023-03-19 | End: 2023-03-22 | Stop reason: HOSPADM

## 2023-03-19 RX ORDER — HYDROXYZINE PAMOATE 50 MG/1
50 CAPSULE ORAL EVERY 6 HOURS PRN
Status: DISCONTINUED | OUTPATIENT
Start: 2023-03-19 | End: 2023-03-22 | Stop reason: HOSPADM

## 2023-03-19 RX ORDER — ALUMINA, MAGNESIA, AND SIMETHICONE 2400; 2400; 240 MG/30ML; MG/30ML; MG/30ML
15 SUSPENSION ORAL EVERY 6 HOURS PRN
Status: DISCONTINUED | OUTPATIENT
Start: 2023-03-19 | End: 2023-03-22 | Stop reason: HOSPADM

## 2023-03-19 RX ORDER — ACETAMINOPHEN 325 MG/1
650 TABLET ORAL EVERY 4 HOURS PRN
Status: DISCONTINUED | OUTPATIENT
Start: 2023-03-19 | End: 2023-03-22 | Stop reason: HOSPADM

## 2023-03-19 RX ADMIN — CLONIDINE HYDROCHLORIDE 0.1 MG: 0.1 TABLET ORAL at 21:08

## 2023-03-19 RX ADMIN — TRAZODONE HYDROCHLORIDE 100 MG: 50 TABLET ORAL at 21:07

## 2023-03-19 NOTE — SIGNIFICANT NOTE
03/19/23 1546   Plan   [facility].

## 2023-03-19 NOTE — ED PROVIDER NOTES
Time: 11:37 AM EDT  Date of encounter:  3/19/2023  Independent Historian/Clinical History and Information was obtained by:   Patient  Chief Complaint: psychiatric evaluation     History is limited by: N/A    History of Present Illness:  Patient is a 47 y.o. year old male who presents to the emergency department for evaluation of psychiatric evaluation . Patient states he is anxious, and paranoid. Patient states he was at [facility] in [place], [place] .and went to [facility] for psychiatric care in [place].. Patient states he was not received good treatment at [facility] and was sent back to [facility].  He was then [facility] from [facility] in [place] to [facility] in [facility].  Patient states he is not homicidal but he does hear voices. Patient states he is scared for his safety, fearing that he will do something to himself. Patient is seeking counseling to prevent further suicidal thoughts. Patient states he does not want to harm himself at this time. Patient states has hx of substance abuse and states he threw away Fentanyl. Patient states he is homeless and he came to our emergency department from [facility].      History provided by:  Patient   used: No        Patient Care Team  Primary Care Provider: Provider, No Known    Past Medical History:     No Known Allergies  Past Medical History:   Diagnosis Date   • Alcohol abuse    • Anxiety    • Bipolar disorder (HCC)    • Depression    • Hepatitis C infection    • Latent tuberculosis    • Mood disorder (HCC)    • PTSD (post-traumatic stress disorder)    • Suicide attempt (HCC)      Past Surgical History:   Procedure Laterality Date   • HAND SURGERY      right hand      History reviewed. No pertinent family history.    Home Medications:  Prior to Admission medications    Medication Sig Start Date End Date Taking? Authorizing Provider   cloNIDine (CATAPRES) 0.1 MG tablet Take 1 tablet by mouth 3 (Three) Times a  Day As Needed for High Blood Pressure.    Filemon Harris MD   hydrOXYzine pamoate (VISTARIL) 25 MG capsule Take 1 capsule by mouth 3 (Three) Times a Day As Needed for Itching or Anxiety.    Filemon Harris MD   losartan (COZAAR) 50 MG tablet Take 1 tablet by mouth Daily.    Filemon Harris MD   multivitamin (Daily Vites) tablet tablet Take 1 tablet by mouth Daily.    Filemon Harris MD   propranolol LA (INDERAL LA) 60 MG 24 hr capsule Take 1 capsule by mouth Daily.    Filemon Harris MD   QUEtiapine (SEROquel) 200 MG tablet Take 1 tablet by mouth Every Night.    Filemon Harris MD   rifAMPin (RIFADIN) 300 MG capsule Take 1 capsule by mouth 2 (Two) Times a Day.    Filemon Harris MD        Social History:   Social History     Tobacco Use   • Smoking status: Every Day     Packs/day: 0.50     Types: Cigarettes   • Smokeless tobacco: Never   Vaping Use   • Vaping Use: Never used   Substance Use Topics   • Alcohol use: Not Currently     Alcohol/week: 2.0 standard drinks     Types: 2 Cans of beer per week   • Drug use: Yes     Types: Amphetamines, Marijuana, Methamphetamines         Review of Systems:  Review of Systems   Constitutional: Negative for activity change, chills, fatigue and unexpected weight change.   HENT: Negative for congestion, sinus pressure, sore throat and trouble swallowing.    Eyes: Negative for pain, discharge, redness and visual disturbance.   Respiratory: Negative for cough, chest tightness, shortness of breath and wheezing.    Cardiovascular: Negative for chest pain and palpitations.   Gastrointestinal: Negative for abdominal pain, diarrhea, nausea and vomiting.   Endocrine: Negative for cold intolerance and polydipsia.   Genitourinary: Negative for dysuria, frequency, hematuria and urgency.   Musculoskeletal: Negative for arthralgias, joint swelling, neck pain and neck stiffness.   Skin: Negative for color change and rash.   Allergic/Immunologic:  Negative for environmental allergies and immunocompromised state.   Neurological: Negative for dizziness, weakness and light-headedness.   Hematological: Does not bruise/bleed easily.   Psychiatric/Behavioral: Positive for hallucinations (hearing voices) and suicidal ideas. Negative for agitation, confusion and dysphoric mood.        Physical Exam:  /90 (BP Location: Left arm, Patient Position: Sitting)   Pulse 86   Temp 98.8 °F (37.1 °C) (Oral)   Resp 18   SpO2 95%     Physical Exam  Vitals and nursing note reviewed.   Constitutional:       General: He is not in acute distress.  HENT:      Head: Normocephalic and atraumatic.      Right Ear: External ear normal.      Left Ear: External ear normal.      Nose: Nose normal.      Mouth/Throat:      Mouth: Mucous membranes are moist.   Eyes:      Extraocular Movements: Extraocular movements intact.      Conjunctiva/sclera: Conjunctivae normal.      Pupils: Pupils are equal, round, and reactive to light.   Cardiovascular:      Rate and Rhythm: Normal rate and regular rhythm.      Pulses: Normal pulses.      Heart sounds: Normal heart sounds.   Pulmonary:      Effort: Pulmonary effort is normal.      Breath sounds: Normal breath sounds.   Abdominal:      General: Bowel sounds are normal. There is no distension.      Palpations: Abdomen is soft.   Musculoskeletal:         General: Normal range of motion.      Cervical back: Neck supple.   Neurological:      Mental Status: He is alert and oriented to person, place, and time.   Psychiatric:         Mood and Affect: Mood is anxious (mildly).         Behavior: Behavior normal.         Thought Content: Thought content does not include homicidal or suicidal ideation.                  Procedures:  Procedures      Medical Decision Making:      Comorbidities that affect care:    Hypertension   Paranoid delusion  Substance abuse    External Notes reviewed:    Previous Clinic Note: Previous lab and radiological  studies      The following orders were placed and all results were independently analyzed by me:  Orders Placed This Encounter   Procedures   • Acetaminophen Level   • Ethanol   • Urine Drug Screen - Urine, Clean Catch   • Salicylate Level   • Psych / Access to See       Medications Given in the Emergency Department:  Medications - No data to display     ED Course:         Labs:    Lab Results (last 24 hours)     Procedure Component Value Units Date/Time    CBC & Differential [[id number]]  (Abnormal) Collected: 03/18/23 2231    Specimen: Blood Updated: 03/18/23 2255    Narrative:      The following orders were created for panel order CBC & Differential.  Procedure                               Abnormality         Status                     ---------                               -----------         ------                     CBC Auto Differential[[id number]]        Abnormal            Final result                 Please view results for these tests on the individual orders.    Comprehensive Metabolic Panel [[id number]] Collected: 03/18/23 2231    Specimen: Blood Updated: 03/18/23 2314     Glucose 97 mg/dL      BUN 11 mg/dL      Creatinine 0.80 mg/dL      Sodium 140 mmol/L      Potassium 4.0 mmol/L      Chloride 103 mmol/L      CO2 25.6 mmol/L      Calcium 9.4 mg/dL      Total Protein 7.0 g/dL      Albumin 4.2 g/dL      ALT (SGPT) 17 U/L      AST (SGOT) 12 U/L      Alkaline Phosphatase 101 U/L      Total Bilirubin <0.2 mg/dL      Globulin 2.8 gm/dL      A/G Ratio 1.5 g/dL      BUN/Creatinine Ratio 13.8     Anion Gap 11.4 mmol/L      eGFR 111.2 mL/min/1.73     Narrative:      GFR Normal >60  Chronic Kidney Disease <60  Kidney Failure <15      CBC Auto Differential [[id number]]  (Abnormal) Collected: 03/18/23 2231    Specimen: Blood Updated: 03/18/23 2255     WBC 7.87 10*3/mm3      RBC 4.95 10*6/mm3      Hemoglobin 14.4 g/dL      Hematocrit 42.4 %      MCV 85.7 fL      MCH 29.1 pg      MCHC 34.0 g/dL      RDW 13.1 %       RDW-SD 40.4 fl      MPV 10.0 fL      Platelets 283 10*3/mm3      Neutrophil % 44.4 %      Lymphocyte % 40.4 %      Monocyte % 11.1 %      Eosinophil % 2.9 %      Basophil % 0.9 %      Immature Grans % 0.3 %      Neutrophils, Absolute 3.50 10*3/mm3      Lymphocytes, Absolute 3.18 10*3/mm3      Monocytes, Absolute 0.87 10*3/mm3      Eosinophils, Absolute 0.23 10*3/mm3      Basophils, Absolute 0.07 10*3/mm3      Immature Grans, Absolute 0.02 10*3/mm3      nRBC 0.0 /100 WBC     Acetaminophen Level [[id number]]  (Normal) Collected: 03/19/23 1153    Specimen: Blood Updated: 03/19/23 1249     Acetaminophen <5.0 mcg/mL     Ethanol [[id number]] Collected: 03/19/23 1153    Specimen: Blood Updated: 03/19/23 1249     Ethanol <10 mg/dL      Ethanol % <0.010 %     Narrative:      Ethanol (Plasma)  <10 Essentially Negative    Toxic Concentrations           mg/dL    Flushing, slowing of reflexes    Impaired visual activity         Depression of CNS              >100  Possible Coma                  >300       Salicylate Level [[id number]]  (Normal) Collected: 03/19/23 1153    Specimen: Blood Updated: 03/19/23 1249     Salicylate <0.3 mg/dL     Urine Drug Screen - Urine, Clean Catch [[id number]]  (Abnormal) Collected: 03/19/23 1313    Specimen: Urine, Clean Catch Updated: 03/19/23 1421     Amphet/Methamphet, Screen Negative     Barbiturates Screen, Urine Negative     Benzodiazepine Screen, Urine Negative     Cocaine Screen, Urine Negative     Opiate Screen Positive     THC, Screen, Urine Positive     Methadone Screen, Urine Negative     Oxycodone Screen, Urine Negative    Narrative:      Negative Thresholds Per Drugs Screened:    Amphetamines                 500 ng/ml  Barbiturates                 200 ng/ml  Benzodiazepines              100 ng/ml  Cocaine                      300 ng/ml  Methadone                    300 ng/ml  Opiates                      300 ng/ml  Oxycodone                    100 ng/ml  THC                            50 ng/ml    The Normal Value for all drugs tested is negative. This report includes final unconfirmed screening results to be used for medical treatment purposes only. Unconfirmed results must not be used for non-medical purposes such as employment or legal testing. Clinical consideration should be applied to any drug of abuse test, particularly when unconfirmed results are used.                   Imaging:    No Radiology Exams Resulted Within Past 24 Hours      Differential Diagnosis and Discussion:    Psychiatric: Differential diagnosis includes but is not limited to depression, psychosis, bipolar disorder, anxiety, manic episode, schizophrenia, and substance abuse.    All labs were reviewed and interpreted by me.    MDM  Number of Diagnoses or Management Options  Anxiety  Bipolar affective disorder, remission status unspecified (HCC)  Diagnosis management comments: We are currently awaiting an evaluation by the nurse practitioner from [facility].       Amount and/or Complexity of Data Reviewed  Clinical lab tests: reviewed  Decide to obtain previous medical records or to obtain history from someone other than the patient: yes  Obtain history from someone other than the patient: yes  Review and summarize past medical records: yes  Discuss the patient with other providers: yes  Independent visualization of images, tracings, or specimens: yes             Patient Care Considerations:    All appropriate ancillary studies were ordered.      Consultants/Shared Management Plan:    Consultant: I have discussed the case with [facility]  who states that disposition will be dependent upon their evaluation    Social Determinants of Health:    Patient is independent, reliable, and has access to care.       Disposition and Care Coordination:            Final diagnoses:   Bipolar affective disorder, remission status unspecified (HCC)   Anxiety        ED Disposition     None          This medical record created using  voice recognition software.        Documentation assistance provided by Carson Durant acting as scribe for Jd Chandlre DO. Information recorded by the scribe was done at my direction and has been verified and validated by me.          Carson Durant  03/19/23 1148       Carson Durant  03/19/23 1149       Jd Chandler DO  03/19/23 [id number]

## 2023-03-19 NOTE — SIGNIFICANT NOTE
03/19/23 1437   Plan   Plan Comments Psych consult called in at 1426 and forms faxed to [facility].

## 2023-03-20 PROBLEM — F43.10 POSTTRAUMATIC STRESS DISORDER: Status: ACTIVE | Noted: 2023-03-20

## 2023-03-20 PROBLEM — Z72.0 TOBACCO ABUSE: Status: ACTIVE | Noted: 2023-03-20

## 2023-03-20 PROBLEM — F12.10 MARIJUANA ABUSE: Status: ACTIVE | Noted: 2023-03-20

## 2023-03-20 PROBLEM — F33.1 MAJOR DEPRESSIVE DISORDER, RECURRENT EPISODE, MODERATE: Status: ACTIVE | Noted: 2023-03-20

## 2023-03-20 RX ORDER — QUETIAPINE FUMARATE 100 MG/1
100 TABLET, FILM COATED ORAL DAILY
Status: DISCONTINUED | OUTPATIENT
Start: 2023-03-20 | End: 2023-03-22 | Stop reason: HOSPADM

## 2023-03-20 RX ORDER — PROPRANOLOL HCL 60 MG
60 CAPSULE, EXTENDED RELEASE 24HR ORAL DAILY
Status: CANCELLED | OUTPATIENT
Start: 2023-03-20

## 2023-03-20 RX ORDER — LOSARTAN POTASSIUM 50 MG/1
50 TABLET ORAL DAILY
Status: CANCELLED | OUTPATIENT
Start: 2023-03-20

## 2023-03-20 RX ORDER — POLYETHYLENE GLYCOL 3350 17 G
2 POWDER IN PACKET (EA) ORAL
Status: DISCONTINUED | OUTPATIENT
Start: 2023-03-20 | End: 2023-03-22 | Stop reason: HOSPADM

## 2023-03-20 RX ORDER — DIVALPROEX SODIUM 250 MG/1
250 TABLET, DELAYED RELEASE ORAL 3 TIMES DAILY
Status: DISCONTINUED | OUTPATIENT
Start: 2023-03-20 | End: 2023-03-22 | Stop reason: HOSPADM

## 2023-03-20 RX ORDER — QUETIAPINE FUMARATE 200 MG/1
400 TABLET, FILM COATED ORAL NIGHTLY
Status: DISCONTINUED | OUTPATIENT
Start: 2023-03-20 | End: 2023-03-22 | Stop reason: HOSPADM

## 2023-03-20 RX ADMIN — DIVALPROEX SODIUM 250 MG: 250 TABLET, DELAYED RELEASE ORAL at 20:48

## 2023-03-20 RX ADMIN — CLONIDINE HYDROCHLORIDE 0.1 MG: 0.1 TABLET ORAL at 08:29

## 2023-03-20 RX ADMIN — QUETIAPINE FUMARATE 100 MG: 100 TABLET ORAL at 15:51

## 2023-03-20 RX ADMIN — QUETIAPINE FUMARATE 400 MG: 200 TABLET ORAL at 20:49
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
## 2023-03-20 NOTE — PLAN OF CARE
Goal Outcome Evaluation:  Plan of Care Reviewed With: patient  Patient Agreement with Plan of Care: agrees   PATIENT ALERT AND ORIENTED AND CALM AND COOPERATIVE WITH STAFF. COMPLIANT WITH MEDICATIONS. PATIENT DENIES S/I, H/I OR HALLUCINATIONS. PATIENT HAS BEEN UP AND ABOUT ON UNIT INTERACTING APPROPRIATELY WITH STAFF AND PEERS. WILL CONTINUE TO MONITOR FOR CHANGES IN MOOD OR BEHAVIOR.

## 2023-03-21 VITALS
TEMPERATURE: 97.4 F | DIASTOLIC BLOOD PRESSURE: 93 MMHG | SYSTOLIC BLOOD PRESSURE: 129 MMHG | HEIGHT: 73 IN | RESPIRATION RATE: 18 BRPM | WEIGHT: 228.18 LBS | HEART RATE: 74 BPM | BODY MASS INDEX: 30.24 KG/M2 | OXYGEN SATURATION: 98 %

## 2023-03-21 RX ORDER — PROPRANOLOL HYDROCHLORIDE 10 MG/1
10 TABLET ORAL EVERY 12 HOURS
Status: DISCONTINUED | OUTPATIENT
Start: 2023-03-21 | End: 2023-03-22 | Stop reason: HOSPADM

## 2023-03-21 RX ORDER — LOSARTAN POTASSIUM 50 MG/1
50 TABLET ORAL DAILY
Status: DISCONTINUED | OUTPATIENT
Start: 2023-03-21 | End: 2023-03-22 | Stop reason: HOSPADM

## 2023-03-21 RX ORDER — RIFAMPIN 300 MG/1
300 CAPSULE ORAL 2 TIMES DAILY
Status: DISCONTINUED | OUTPATIENT
Start: 2023-03-21 | End: 2023-03-22 | Stop reason: HOSPADM

## 2023-03-21 RX ADMIN — QUETIAPINE FUMARATE 100 MG: 100 TABLET ORAL at 09:04

## 2023-03-21 RX ADMIN — RIFAMPIN 300 MG: 300 CAPSULE ORAL at 12:33

## 2023-03-21 RX ADMIN — PROPRANOLOL HYDROCHLORIDE 10 MG: 10 TABLET ORAL at 12:33

## 2023-03-21 RX ADMIN — DIVALPROEX SODIUM 250 MG: 250 TABLET, DELAYED RELEASE ORAL at 17:06

## 2023-03-21 RX ADMIN — DIVALPROEX SODIUM 250 MG: 250 TABLET, DELAYED RELEASE ORAL at 09:04

## 2023-03-21 RX ADMIN — NICOTINE POLACRILEX 2 MG: 2 LOZENGE ORAL at 17:06

## 2023-03-21 NOTE — PROGRESS NOTES
" [facility]     Psychiatric Progress Note    Patient Name: Inocencio Joy  : 1975  MRN: [id number]  Primary Care Physician:  Provider, No Known  Date of admission: 3/19/2023    Subjective   Subjective     Patient seen and chart reviewed, discussed with staff.    Chief Complaint: Depression, substance abuse      HPI:     Staff reports the patient reported his anxiety and depression as a 9.  He was compliant with medications.  He was noted to have slept well last night.    Patient sleeping but arouses participates in interview.  Patient reports he slept well last night.  Patient is hopeful for referral to [facility] being accepted.  Denies any suicidal or homicidal ideation today.  Patient also has not had any paranoia and states he is not taking other people or somehow connected to people on the outside they are out to get him.  Denies any hallucinations.  He reports he does not feel overly sedated from the quetiapine and slept well.    Patient asking about propranolol for anxiety and Vistaril is not effective and will add back propranolol.  He also is asking about rifampin, it was first prescribed back in November when he should have completed treatment but he reports he was on and off the medicine multiple times over that period and feels that he should continue course of treatment and will continue.        Objective   Objective     Vitals:   Temp:  [97.7 °F (36.5 °C)] 97.7 °F (36.5 °C)  Heart Rate:  [62] 62  Resp:  [18] 18  BP: (106)/(62) 106/62          Mental Status Exam:      Appearance:   Developed, well nourished, no acute distress  Reliability:   Fair  Eye Contact:   Good  Concentration/Focus:    Attentive to the interview  Behaviors:    No restlessness or agitation  Memory :    Sensorium intact, no deficit  Speech:    Normal rate and volume  Language:   Appropriate, relevant  Mood :    \"It is all right\"   Affect:    Constricted  Thought process:    Goal directed  Thought Content:    Denies " suicidal or homicidal ideation, no auditory visualizations  Insight:   Limited  Judgement:    Fair      Result Review    Result Review:  I have personally reviewed the results from the time of this admission to 3/21/2023 11:45 EDT and agree with these findings:  []  Laboratory  []  Microbiology  []  Radiology  []  EKG/Telemetry   []  Cardiology/Vascular   []  Pathology  []  Old records  []  Other:  Most notable findings include:     Lab Results (last 24 hours)     ** No results found for the last 24 hours. **              Medications:   divalproex, 250 mg, Oral, TID  QUEtiapine, 100 mg, Oral, Daily  QUEtiapine, 400 mg, Oral, Nightly          Assessment / Plan       Active Hospital Problems:  Active Hospital Problems    Diagnosis    • Major depressive disorder, recurrent episode, moderate (HCC)    • Posttraumatic stress disorder    • Tobacco abuse    • Marijuana abuse    • Hypertension    • Methamphetamine abuse (HCC)        Plan:     • Reinitiate rifampin patient states he has not completed a full course  • Initiate propranolol for anxiety  • Continue other meds as ordered and titrate as clinically indicated  • Referral to [facility]  • Work on mood stabilization and abatement of any suicidal ideation or psychosis.  Work on appropriate safety plan  • Continue supportive therapy  • Patient to engage in all group and individual treatment modalities available on the unit  • Obtain collateral information if possible  • Titrate medications as clinically indicated  • Work on appropriate disposition follow-up including referrals to substance abuse treatment if indicated      Disposition:  I expect patient to be discharged 1 to 2 days.    Part of this note may be an electronic transcription/translation of spoken language to printed text using the Dragon dictation system.         Electronically signed by Rob Jay MD, 03/21/23, 11:45 AM EDT.

## 2023-03-21 NOTE — PLAN OF CARE
Goal Outcome Evaluation:  Plan of Care Reviewed With: patient  Patient Agreement with Plan of Care: agrees   PATIENT ALERT AND ORIENTED AND COOPERATIVE WITH STAFF. PATIENT DENIES S/I, H/I OR HALLUCINATIONS. PATIENT IS EASILY AGITATED AND BECAME VERY IRRITABLE RELATED TO HIS MEDICATIONS NOT BEING ORDERED. PATIENT HAS BEEN WITHDRAWN TO HIS ROOM MAJORITY OF SHIFT SLEEPING. WILL CONTINUE TO MONITOR FOR CHANGES IN MOOD OR BEHAVIOR.

## 2023-03-21 NOTE — NURSING NOTE
Patient up at nurse's station yelling and screaming at nurse demanding to be discharged from unit. Dr Jay notified and said to discharge patient [facility]. Security notified and patient will be escorted off property.

## 2023-03-21 NOTE — SIGNIFICANT NOTE
03/21/23 1044   Plan   Plan Patient agreed to [facility]. Clinical information sent, pt to complete phone intake per [facility] request.   Patient/Family in Agreement with Plan yes   Final Discharge Disposition Code 30 - still a patient

## 2023-03-21 NOTE — PLAN OF CARE
Goal Outcome Evaluation:  Plan of Care Reviewed With: patient  Patient Agreement with Plan of Care: agrees  Patient is alert and oriented, reports feeling depressed and anxious and rates a 9/10. Denies suicidal and homicidal ideations,denies hallucinations.treatment plan is ongoing.

## 2023-03-21 NOTE — SIGNIFICANT NOTE
03/21/23 1416   Plan   Plan Patient accepted to [facility], orders completed for [place] location, patient expected 1pm   Patient/Family in Agreement with Plan yes  (Patient agreed to discharge plan.)   Final Discharge Disposition Code 30 - still a patient

## 2023-03-22 NOTE — DISCHARGE SUMMARY
[facility]         DISCHARGE SUMMARY    Patient Name: Inocencio Joy  : 1975  MRN: [id number]    Date of Admission: 3/19/2023  Date of Discharge: 3/21/2023  Primary Care Physician: Provider, No Known    Consults     No orders found from 2023 to 3/20/2023.          Presenting Problem:   Psychosis (HCC) [F29]    Active and Resolved Hospital Problems:  Active Hospital Problems    Diagnosis POA   • Major depressive disorder, recurrent episode, moderate (HCC) [F33.1] Yes   • Posttraumatic stress disorder [F43.10] Yes   • Tobacco abuse [Z72.0] Yes   • Marijuana abuse [F12.10] Yes   • Hypertension [I10] Yes   • Methamphetamine abuse (HCC) [F15.10] Yes      Resolved Hospital Problems   No resolved problems to display.         Hospital Course     Hospital Course:  Inocencio Joy is a 47 y.o. male admitted on a voluntary basis for reported paranoia and feelings of hopelessness with some depression.  He reported some vague suicidal ideation as well as hearing of voices in the emergency room.    Patient reportedly been off of his medication for some time.  He reports that quetiapine was worked very well for him in the past and he was started on quetiapine 400 mg at bedtime.  This was effective in helping to lessen his anxiety, and he reportedly slept much better on this medication.    She continues to complain of some anxiety stating Vistaril was not effective.  We added a dose of 100 mg of quetiapine in the morning to help with anxiety.  This did not make him sedated during the day and he tolerated it well.  However, he is not sure if it helped with his anxiety that much and the dose is going to be titrated.    Patient initially was calm and cooperative.  He was engaging.  He has been denying suicidal ideation throughout his stay.  Patient was noted to have some irritability at times.  The patient was also somewhat demanding.  Patient got upset because some of his medications, 1 of which was  propranolol, had not been started back.  However, on day of admission he had stated propranolol was not effective and reported having significant side effects from the medication and did not think it was effective and asked to remain off of it.  Later that day he was complaining to nursing staff that he did not have his propranolol and that he felt like he had to have the medication and so it was continued.  Patient has been somewhat of a conundrum and is reported contradictory symptoms or reactions to the medications throughout his stay.  He denied any hallucinations but reported feeling like people were out after to get him.  He has a very long history of methamphetamine use and exhibiting paranoia and delusional thinking indicative of methamphetamine use.  Toxicology screen was negative for methamphetamine but was positive for opiates and marijuana.    [facility] [place] [facility].  Patient has not been able to stay sober and has been to rehab more than 30 times.  Reports that he has been court ordered to rehab, but never seems to finish a program.  On day of discharge the patient become increasingly irritable and agitated.  He became very demanding.  Was noted to be inappropriately cursing and yelling at staff.  Patient began demanding to leave the hospital that he was here on a voluntary basis.  Patient was denying hallucinations there is no hallucinations throughout his stay.  He has been denying suicidal ideations.  Patient is continued to use substances and has not had any sobriety.  His toxicology screen was positive for opiates in the emergency room, but denied use.  However, he attempted to give reason why his toxicology screen could be positive but it was unrealistic and he reported passively touching services that had residue on it and may have later licked his finger.    Patient demanding to be discharged and was discharged  AMA.      DISCHARGE Follow Up Recommendations for labs and diagnostics: Routine labs per primary care, drug and alcohol rehabilitation      Day of Discharge     Vital Signs:         Pertinent  and/or Most Recent Results     LAB RESULTS:      Lab 03/18/23 2231 03/16/23  1806   WBC 7.87 10.08   HEMOGLOBIN 14.4 15.3   HEMATOCRIT 42.4 45.3   PLATELETS 283 318   NEUTROS ABS 3.50 6.61   IMMATURE GRANS (ABS) 0.02 0.03   LYMPHS ABS 3.18* 2.29   MONOS ABS 0.87 0.95*   EOS ABS 0.23 0.12   MCV 85.7 86.3         Lab 03/18/23 2231 03/16/23  1806   SODIUM 140 141   POTASSIUM 4.0 3.9   CHLORIDE 103 105   CO2 25.6 25.3   ANION GAP 11.4 10.7   BUN 11 13   CREATININE 0.80 0.91   EGFR 111.2 105.9   GLUCOSE 97 115*   CALCIUM 9.4 9.9   MAGNESIUM  --  2.0         Lab 03/18/23 2231 03/16/23  1806   TOTAL PROTEIN 7.0 8.2   ALBUMIN 4.2 4.8   GLOBULIN 2.8 3.4   ALT (SGPT) 17 20   AST (SGOT) 12 16   BILIRUBIN <0.2 0.2   ALK PHOS 101 91                                 Lab 03/16/23  1815   COVID19 Not Detected         Lab 03/19/23  1153   ETHANOL PCT <0.010   ETHANOL MGDL <10         Lab 03/19/23  1313 03/16/23  1815   AMPH/METHAM SCREEN, URINE Negative  --    BENZODIAZEPINE SCREEN, URINE Negative Negative   COCAINE SCREEN, URINE Negative Negative   OPIATES Positive* Negative   THC URINE SCREEN Positive* Positive*   METHADONE SCREEN, URINE Negative Negative     Brief Urine Lab Results  (Last result in the past 365 days)      Color   Clarity   Blood   Leuk Est   Nitrite   Protein   CREAT   Urine HCG        03/16/23 1815 Yellow   Clear   Negative   Negative   Negative   Negative                                     Imaging Results (Last 7 Days)     ** No results found for the last 168 hours. **           Labs Pending at Discharge:           Discharge Details        Discharge Medications      ASK your doctor about these medications      Instructions Start Date   cloNIDine 0.1 MG tablet  Commonly known as: CATAPRES   0.1 mg, Oral, 3 Times Daily  PRN      hydrOXYzine pamoate 25 MG capsule  Commonly known as: VISTARIL   25 mg, Oral, 3 Times Daily PRN      losartan 50 MG tablet  Commonly known as: COZAAR   50 mg, Oral, Daily      multivitamin tablet tablet   1 tablet, Oral, Daily      propranolol LA 60 MG 24 hr capsule  Commonly known as: INDERAL LA   60 mg, Oral, Daily      QUEtiapine 200 MG tablet  Commonly known as: SEROquel   200 mg, Oral, Nightly      rifAMPin 300 MG capsule  Commonly known as: RIFADIN   300 mg, Oral, 2 Times Daily             No Known Allergies      Discharge Disposition:  Home or Self Care    Diet:  Hospital:No active diet order        Discharge Activity:       Discharge Condition: Fair    CODE STATUS:  Code Status and Medical Interventions:   Ordered at: 03/20/23 1058     Code Status (Patient has no pulse and is not breathing):    CPR (Attempt to Resuscitate)     Medical Interventions (Patient has pulse or is breathing):    Full Support         No future appointments.        Time spent on Discharge including face to face service: 30 minutes    Part of this note may be an electronic transcription/translation of spoken language to printed text using the Dragon dictation system.        Electronically signed by Rob Jay MD, 03/22/23, 5:01 PM EDT.

## 2024-10-24 ENCOUNTER — TRANSCRIBE ORDERS (OUTPATIENT)
Dept: ADMINISTRATIVE | Facility: HOSPITAL | Age: 49
End: 2024-10-24
Payer: MEDICAID

## 2024-10-24 ENCOUNTER — HOSPITAL ENCOUNTER (OUTPATIENT)
Dept: GENERAL RADIOLOGY | Facility: HOSPITAL | Age: 49
Discharge: HOME | End: 2024-10-24
Admitting: NURSE PRACTITIONER
Payer: MEDICAID

## 2024-10-24 DIAGNOSIS — Z11.1 SCREENING FOR TUBERCULOSIS: ICD-10-CM

## 2024-10-24 DIAGNOSIS — Z22.7 TB LUNG, LATENT: Primary | ICD-10-CM

## 2024-10-24 DIAGNOSIS — Z22.7 TB LUNG, LATENT: ICD-10-CM

## 2024-10-24 PROCEDURE — 71045 X-RAY EXAM CHEST 1 VIEW: CPT
